# Patient Record
Sex: FEMALE
[De-identification: names, ages, dates, MRNs, and addresses within clinical notes are randomized per-mention and may not be internally consistent; named-entity substitution may affect disease eponyms.]

---

## 2018-03-13 PROBLEM — Z00.00 ENCOUNTER FOR PREVENTIVE HEALTH EXAMINATION: Status: ACTIVE | Noted: 2018-03-13

## 2018-05-03 ENCOUNTER — LABORATORY RESULT (OUTPATIENT)
Age: 43
End: 2018-05-03

## 2018-05-03 ENCOUNTER — APPOINTMENT (OUTPATIENT)
Dept: RHEUMATOLOGY | Facility: CLINIC | Age: 43
End: 2018-05-03
Payer: MEDICAID

## 2018-05-03 ENCOUNTER — TRANSCRIPTION ENCOUNTER (OUTPATIENT)
Age: 43
End: 2018-05-03

## 2018-05-03 VITALS
BODY MASS INDEX: 30.21 KG/M2 | HEART RATE: 90 BPM | WEIGHT: 160 LBS | HEIGHT: 61 IN | SYSTOLIC BLOOD PRESSURE: 123 MMHG | RESPIRATION RATE: 16 BRPM | OXYGEN SATURATION: 100 % | DIASTOLIC BLOOD PRESSURE: 88 MMHG | TEMPERATURE: 98.5 F

## 2018-05-03 DIAGNOSIS — K80.20 CALCULUS OF GALLBLADDER W/OUT CHOLECYSTITIS W/OUT OBSTRUCTION: ICD-10-CM

## 2018-05-03 DIAGNOSIS — Z78.9 OTHER SPECIFIED HEALTH STATUS: ICD-10-CM

## 2018-05-03 DIAGNOSIS — Z82.61 FAMILY HISTORY OF ARTHRITIS: ICD-10-CM

## 2018-05-03 DIAGNOSIS — Z82.0 FAMILY HISTORY OF EPILEPSY AND OTHER DISEASES OF THE NERVOUS SYSTEM: ICD-10-CM

## 2018-05-03 DIAGNOSIS — Z80.41 FAMILY HISTORY OF MALIGNANT NEOPLASM OF OVARY: ICD-10-CM

## 2018-05-03 DIAGNOSIS — Z83.3 FAMILY HISTORY OF DIABETES MELLITUS: ICD-10-CM

## 2018-05-03 DIAGNOSIS — Z82.62 FAMILY HISTORY OF OSTEOPOROSIS: ICD-10-CM

## 2018-05-03 DIAGNOSIS — Z84.89 FAMILY HISTORY OF OTHER SPECIFIED CONDITIONS: ICD-10-CM

## 2018-05-03 PROCEDURE — 20605 DRAIN/INJ JOINT/BURSA W/O US: CPT | Mod: LT

## 2018-05-03 PROCEDURE — 99205 OFFICE O/P NEW HI 60 MIN: CPT | Mod: 25

## 2018-05-03 RX ORDER — LIDOCAINE HYDROCHLORIDE 10 MG/ML
1 INJECTION, SOLUTION INFILTRATION; PERINEURAL
Qty: 0 | Refills: 0 | Status: COMPLETED | OUTPATIENT
Start: 2018-05-03

## 2018-05-03 RX ORDER — METHYLPRED ACET/NACL,ISO-OS/PF 40 MG/ML
40 VIAL (ML) INJECTION
Qty: 1 | Refills: 0 | Status: COMPLETED | OUTPATIENT
Start: 2018-05-03

## 2018-05-03 RX ADMIN — METHYLPREDNISOLONE ACETATE 0 MG/ML: 40 INJECTION, SUSPENSION INTRA-ARTICULAR; INTRALESIONAL; INTRAMUSCULAR; SOFT TISSUE at 00:00

## 2018-05-03 RX ADMIN — Medication 0 %: at 00:00

## 2018-05-04 PROBLEM — Z80.41 FAMILY HISTORY OF MALIGNANT NEOPLASM OF OVARY: Status: ACTIVE | Noted: 2018-05-03

## 2018-05-04 PROBLEM — Z83.3 FAMILY HISTORY OF DIABETES MELLITUS: Status: ACTIVE | Noted: 2018-05-03

## 2018-05-04 PROBLEM — Z82.62 FAMILY HISTORY OF OSTEOPOROSIS: Status: ACTIVE | Noted: 2018-05-03

## 2018-05-04 PROBLEM — Z82.61 FAMILY HISTORY OF ARTHRITIS: Status: ACTIVE | Noted: 2018-05-03

## 2018-05-04 PROBLEM — Z84.89 FAMILY HISTORY OF HEART MURMUR: Status: ACTIVE | Noted: 2018-05-03

## 2018-05-04 PROBLEM — Z82.0 FAMILY HISTORY OF ALZHEIMER'S DISEASE: Status: ACTIVE | Noted: 2018-05-03

## 2018-05-04 PROBLEM — Z78.9 NON-SMOKER: Status: ACTIVE | Noted: 2018-05-04

## 2018-05-04 PROBLEM — K80.20 GALLSTONES: Status: RESOLVED | Noted: 2018-05-03 | Resolved: 2018-05-04

## 2018-05-04 LAB
CRP SERPL-MCNC: 0.3 MG/DL
ERYTHROCYTE [SEDIMENTATION RATE] IN BLOOD BY WESTERGREN METHOD: 7 MM/HR
MPO AB + PR3 PNL SER: NORMAL

## 2018-05-04 RX ORDER — CHROMIUM 200 MCG
TABLET ORAL
Refills: 0 | Status: ACTIVE | COMMUNITY

## 2018-05-04 RX ORDER — METRONIDAZOLE 250 MG/1
250 TABLET, FILM COATED ORAL
Refills: 0 | Status: ACTIVE | COMMUNITY

## 2018-05-04 RX ORDER — FOLIC ACID 20 MG
CAPSULE ORAL
Refills: 0 | Status: ACTIVE | COMMUNITY

## 2018-05-04 RX ORDER — OMEPRAZOLE, SODIUM BICARBONATE 40; 1100 MG/1; MG/1
CAPSULE ORAL
Refills: 0 | Status: ACTIVE | COMMUNITY

## 2018-05-04 RX ORDER — ALBUTEROL SULFATE 4 MG/1
TABLET ORAL
Refills: 0 | Status: ACTIVE | COMMUNITY

## 2018-05-31 ENCOUNTER — RESULT REVIEW (OUTPATIENT)
Age: 43
End: 2018-05-31

## 2018-05-31 ENCOUNTER — APPOINTMENT (OUTPATIENT)
Dept: RHEUMATOLOGY | Facility: CLINIC | Age: 43
End: 2018-05-31
Payer: MEDICAID

## 2018-05-31 VITALS
SYSTOLIC BLOOD PRESSURE: 120 MMHG | RESPIRATION RATE: 16 BRPM | BODY MASS INDEX: 30.02 KG/M2 | DIASTOLIC BLOOD PRESSURE: 78 MMHG | OXYGEN SATURATION: 99 % | WEIGHT: 159 LBS | HEART RATE: 73 BPM | TEMPERATURE: 97.9 F | HEIGHT: 61 IN

## 2018-05-31 DIAGNOSIS — M25.561 PAIN IN RIGHT KNEE: ICD-10-CM

## 2018-05-31 PROCEDURE — 99214 OFFICE O/P EST MOD 30 MIN: CPT | Mod: 25

## 2018-05-31 PROCEDURE — 20605 DRAIN/INJ JOINT/BURSA W/O US: CPT | Mod: RT

## 2018-05-31 RX ORDER — METHYLPRED ACET/NACL,ISO-OS/PF 40 MG/ML
40 VIAL (ML) INJECTION
Qty: 1 | Refills: 0 | Status: COMPLETED | OUTPATIENT
Start: 2018-05-31

## 2018-05-31 RX ORDER — DICLOFENAC SODIUM 16.05 MG/ML
1.5 SOLUTION TOPICAL
Qty: 1 | Refills: 0 | Status: ACTIVE | COMMUNITY
Start: 2018-05-31 | End: 1900-01-01

## 2018-05-31 RX ORDER — LIDOCAINE HYDROCHLORIDE 10 MG/ML
1 INJECTION, SOLUTION INFILTRATION; PERINEURAL
Qty: 0 | Refills: 0 | Status: COMPLETED | OUTPATIENT
Start: 2018-05-31

## 2018-05-31 RX ADMIN — Medication 0 MG/ML: at 00:00

## 2018-05-31 RX ADMIN — LIDOCAINE HYDROCHLORIDE 0 %: 10 INJECTION, SOLUTION EPIDURAL; INFILTRATION; INTRACAUDAL; PERINEURAL at 00:00

## 2018-09-27 ENCOUNTER — APPOINTMENT (OUTPATIENT)
Dept: RHEUMATOLOGY | Facility: CLINIC | Age: 43
End: 2018-09-27

## 2019-01-03 ENCOUNTER — APPOINTMENT (OUTPATIENT)
Dept: RHEUMATOLOGY | Facility: CLINIC | Age: 44
End: 2019-01-03
Payer: MEDICAID

## 2019-01-03 VITALS
DIASTOLIC BLOOD PRESSURE: 88 MMHG | WEIGHT: 174 LBS | TEMPERATURE: 98.1 F | SYSTOLIC BLOOD PRESSURE: 122 MMHG | BODY MASS INDEX: 32.85 KG/M2 | RESPIRATION RATE: 16 BRPM | OXYGEN SATURATION: 98 % | HEIGHT: 61 IN | HEART RATE: 84 BPM

## 2019-01-03 PROCEDURE — 99214 OFFICE O/P EST MOD 30 MIN: CPT

## 2019-03-14 ENCOUNTER — APPOINTMENT (OUTPATIENT)
Dept: RHEUMATOLOGY | Facility: CLINIC | Age: 44
End: 2019-03-14
Payer: MEDICAID

## 2019-03-14 VITALS
HEART RATE: 77 BPM | HEIGHT: 61 IN | WEIGHT: 171 LBS | OXYGEN SATURATION: 97 % | RESPIRATION RATE: 16 BRPM | BODY MASS INDEX: 32.28 KG/M2 | SYSTOLIC BLOOD PRESSURE: 126 MMHG | TEMPERATURE: 98.4 F | DIASTOLIC BLOOD PRESSURE: 86 MMHG

## 2019-03-14 PROCEDURE — 99214 OFFICE O/P EST MOD 30 MIN: CPT | Mod: 25

## 2019-03-14 PROCEDURE — 20550 NJX 1 TENDON SHEATH/LIGAMENT: CPT

## 2019-03-14 RX ORDER — MELOXICAM 15 MG/1
15 TABLET ORAL
Qty: 30 | Refills: 1 | Status: ACTIVE | COMMUNITY
Start: 2018-05-03 | End: 1900-01-01

## 2019-03-14 RX ORDER — DICLOFENAC SODIUM 10 MG/G
1 GEL TOPICAL
Qty: 1 | Refills: 2 | Status: ACTIVE | COMMUNITY
Start: 2019-03-14 | End: 1900-01-01

## 2019-03-14 RX ADMIN — Medication 0 MG/ML: at 00:00

## 2019-03-14 RX ADMIN — LIDOCAINE HYDROCHLORIDE 0 %: 10 INJECTION, SOLUTION INFILTRATION; PERINEURAL at 00:00

## 2019-03-18 NOTE — REVIEW OF SYSTEMS
[Recent Weight Gain (___ Lbs)] : recent [unfilled] ~Ulb weight gain [Abdominal Pain] : abdominal pain [Diarrhea] : diarrhea [Joint Pain] : joint pain [Joint Swelling] : joint swelling [Joint Stiffness] : joint stiffness [Difficulty Walking] : difficulty walking [Feelings Of Weakness] : feelings of weakness [Fever] : no fever [Chills] : no chills [Eye Pain] : no eye pain [Red Eyes] : eyes not red [Dry Eyes] : no dryness of the eyes [Sore Throat] : no sore throat [Hoarseness] : no hoarseness [Chest Pain] : no chest pain [Palpitations] : no palpitations [Lower Ext Edema] : no lower extremity edema [Shortness Of Breath] : no shortness of breath [Cough] : no cough [SOB on Exertion] : no shortness of breath during exertion [Heartburn] : no heartburn [Skin Lesions] : no skin lesions [Anxiety] : no anxiety [Depression] : no depression [Muscle Weakness] : no muscle weakness [Easy Bleeding] : no tendency for easy bleeding [Easy Bruising] : no tendency for easy bruising

## 2019-03-18 NOTE — PHYSICAL EXAM
[General Appearance - Alert] : alert [General Appearance - In No Acute Distress] : in no acute distress [Sclera] : the sclera and conjunctiva were normal [Examination Of The Oral Cavity] : the lips and gums were normal [Oropharynx] : the oropharynx was normal [Neck Appearance] : the appearance of the neck was normal [Auscultation Breath Sounds / Voice Sounds] : lungs were clear to auscultation bilaterally [Heart Rate And Rhythm] : heart rate was normal and rhythm regular [Edema] : there was no peripheral edema [No Spinal Tenderness] : no spinal tenderness [Abnormal Walk] : normal gait [Nail Clubbing] : no clubbing  or cyanosis of the fingernails [Musculoskeletal - Swelling] : no joint swelling seen [Motor Tone] : muscle strength and tone were normal [] : no rash [Skin Lesions] : no skin lesions [Motor Exam] : the motor exam was normal [Oriented To Time, Place, And Person] : oriented to person, place, and time [FreeTextEntry1] : Tinel + b/l

## 2019-03-18 NOTE — DATA REVIEWED
[FreeTextEntry1] : Blood tests reviewed from [facility] 2/14 /2018\par Rheumatoid factor negative\par Anti-CCP negative\par GLENDA negative\par Anti-SSA SSB negative\par Anti-Hull RNP negative\par Negative for anti-Smith\par TSH within normal limits\par CRP of 1.4 (upper limit normal limits of less than 0.5) ESR of 36 \par mildly elevated vitamin D of 20.3\par Hepatitis panel negative

## 2019-03-18 NOTE — CONSULT LETTER
[Dear  ___] : Dear  [unfilled], [Courtesy Letter:] : I had the pleasure of seeing your patient, [unfilled], in my office today. [Please see my note below.] : Please see my note below. [Consult Closing:] : Thank you very much for allowing me to participate in the care of this patient.  If you have any questions, please do not hesitate to contact me. [Sincerely,] : Sincerely, [Fany Vann MD] : Fany Vann MD [ of Medicine] :  of Medicine [[facility]] : [facility] [FreeTextEntry2] : Christy Floyd MD\par [address]\par [facility]  [id number] [FreeTextEntry3] : Fany Vann M.D.\par  of Medicine \par [facility] at [facility]/[place]\par \par

## 2019-03-18 NOTE — HISTORY OF PRESENT ILLNESS
[FreeTextEntry1] : Patient reports resurfacing of pain in the hands bilaterally, especially the left hand . She reports the pain left thumb interferes with ADLs;  she cannot extend or flex the  thumb as she is with shooting pain up the left extremity. She finds she is dropping things from her hands as well ; she explains cortisone injections given in April and May of 2018 lended to pain free symptoms  from CTS for many months until she found repetitive tasks at work leading to resurfacing of pain and paresthesias. She also describes  pain upon tight grasp. She reports neck spasm lending to discomfort.  She has not been able to see obtain wrist splint secondary to cost. She otherwise denies joint swelling. \par She denies motor disturbances or constitutional symptoms.\par \par

## 2019-03-18 NOTE — PROCEDURE
[Other Date:___] : Date: [unfilled] [Patient] : the patient [Risks] : risks [Therapeutic] : therapeutic [#1 Site: ______] : #1 site identified in the [unfilled] [Betadine] : betadine solution [25 gauge 1.5 inch] : A 25 gauge 1.5 inch needle was used [___ml 1% Lidocaine] : [unfilled] ml of 1% lidocaine [Depomedrol ___ mg] : Depomedrol [unfilled] mg [No Complications] : there were no complications [Patient Instructed to Call] : patient was instructed to call if redness at site, a decrease in range of motion or an increase in pain is noted after procedure.

## 2019-03-18 NOTE — ASSESSMENT
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  Patient will benefit from PT/OT to help with joint mobility and muscle strengthening. Cortisone injection for De Quervain's for relief.  Cervical neck exercises demonstrated.  \par Quadriceps strengthening exercises reiterated in the office. Weight loss has been encouraged to reduce load over the medial joint line. Viscosupplementation has been encouraged to provide additional lubrication and joint support. In the interim, Diclofenac gel has been prescribed as well as judicious use of anti-inflammatory therapy if with worsening pain. Trial of NSAIDs given as well. \par She is in agreement with the above plan and will return in one months' time.\par \par  \par

## 2019-05-23 ENCOUNTER — LABORATORY RESULT (OUTPATIENT)
Age: 44
End: 2019-05-23

## 2019-05-23 ENCOUNTER — APPOINTMENT (OUTPATIENT)
Dept: RHEUMATOLOGY | Facility: CLINIC | Age: 44
End: 2019-05-23
Payer: MEDICAID

## 2019-05-23 VITALS
OXYGEN SATURATION: 96 % | DIASTOLIC BLOOD PRESSURE: 86 MMHG | RESPIRATION RATE: 16 BRPM | TEMPERATURE: 97.7 F | SYSTOLIC BLOOD PRESSURE: 119 MMHG | HEART RATE: 72 BPM | WEIGHT: 160 LBS | BODY MASS INDEX: 30.21 KG/M2 | HEIGHT: 61 IN

## 2019-05-23 DIAGNOSIS — M25.50 PAIN IN UNSPECIFIED JOINT: ICD-10-CM

## 2019-05-23 DIAGNOSIS — S16.1XXA STRAIN OF MUSCLE, FASCIA AND TENDON AT NECK LEVEL, INITIAL ENCOUNTER: ICD-10-CM

## 2019-05-23 PROCEDURE — 99214 OFFICE O/P EST MOD 30 MIN: CPT | Mod: 25

## 2019-05-23 PROCEDURE — 20550 NJX 1 TENDON SHEATH/LIGAMENT: CPT | Mod: LT

## 2019-05-23 RX ORDER — METHYLPRED ACET/NACL,ISO-OS/PF 40 MG/ML
40 VIAL (ML) INJECTION
Qty: 1 | Refills: 0 | Status: COMPLETED | OUTPATIENT
Start: 2019-05-23

## 2019-05-23 RX ORDER — LIDOCAINE HYDROCHLORIDE 10 MG/ML
1 INJECTION, SOLUTION INFILTRATION; PERINEURAL
Qty: 0 | Refills: 0 | Status: COMPLETED | OUTPATIENT
Start: 2019-05-23

## 2019-05-23 RX ADMIN — Medication 0 MG/ML: at 00:00

## 2019-05-23 RX ADMIN — LIDOCAINE HYDROCHLORIDE 0 %: 10 INJECTION, SOLUTION INFILTRATION; PERINEURAL at 00:00

## 2019-05-23 RX ADMIN — METHYLPREDNISOLONE ACETATE 0 MG/ML: 40 INJECTION, SUSPENSION INTRA-ARTICULAR; INTRALESIONAL; INTRAMUSCULAR; SOFT TISSUE at 00:00

## 2019-05-24 ENCOUNTER — OTHER (OUTPATIENT)
Age: 44
End: 2019-05-24

## 2019-05-27 LAB
ALBUMIN SERPL ELPH-MCNC: 4.6 G/DL
ALP BLD-CCNC: 112 U/L
ALT SERPL-CCNC: 56 U/L
ANION GAP SERPL CALC-SCNC: 14 MMOL/L
APPEARANCE: ABNORMAL
ASO AB SER LA-ACNC: 132 IU/ML
AST SERPL-CCNC: 32 U/L
BASOPHILS # BLD AUTO: 0.06 K/UL
BASOPHILS NFR BLD AUTO: 0.5 %
BILIRUB SERPL-MCNC: 1.4 MG/DL
BILIRUBIN URINE: NEGATIVE
BLOOD URINE: NORMAL
BUN SERPL-MCNC: 10 MG/DL
CALCIUM SERPL-MCNC: 10.1 MG/DL
CHLORIDE SERPL-SCNC: 103 MMOL/L
CO2 SERPL-SCNC: 24 MMOL/L
COLOR: ABNORMAL
CREAT SERPL-MCNC: 0.7 MG/DL
CRP SERPL-MCNC: 0.67 MG/DL
EOSINOPHIL # BLD AUTO: 0.12 K/UL
EOSINOPHIL NFR BLD AUTO: 1 %
ERYTHROCYTE [SEDIMENTATION RATE] IN BLOOD BY WESTERGREN METHOD: 35 MM/HR
GLUCOSE QUALITATIVE U: NEGATIVE
GLUCOSE SERPL-MCNC: 92 MG/DL
HCT VFR BLD CALC: 43.3 %
HGB BLD-MCNC: 13.6 G/DL
IMM GRANULOCYTES NFR BLD AUTO: 0.5 %
KETONES URINE: NEGATIVE
LEUKOCYTE ESTERASE URINE: NEGATIVE
LYMPHOCYTES # BLD AUTO: 3.04 K/UL
LYMPHOCYTES NFR BLD AUTO: 24.3 %
MAN DIFF?: NORMAL
MCHC RBC-ENTMCNC: 27 PG
MCHC RBC-ENTMCNC: 31.4 GM/DL
MCV RBC AUTO: 85.9 FL
MONOCYTES # BLD AUTO: 1.05 K/UL
MONOCYTES NFR BLD AUTO: 8.4 %
NEUTROPHILS # BLD AUTO: 8.17 K/UL
NEUTROPHILS NFR BLD AUTO: 65.3 %
NITRITE URINE: NEGATIVE
PH URINE: 5.5
PLATELET # BLD AUTO: 329 K/UL
POTASSIUM SERPL-SCNC: 3.6 MMOL/L
PROT SERPL-MCNC: 7.4 G/DL
PROTEIN URINE: ABNORMAL
RBC # BLD: 5.04 M/UL
RBC # FLD: 13.1 %
SODIUM SERPL-SCNC: 141 MMOL/L
SPECIFIC GRAVITY URINE: 1.03
URATE SERPL-MCNC: 6 MG/DL
UROBILINOGEN URINE: NORMAL
WBC # FLD AUTO: 12.5 K/UL

## 2019-05-27 NOTE — REVIEW OF SYSTEMS
[Recent Weight Gain (___ Lbs)] : recent [unfilled] ~Ulb weight gain [Abdominal Pain] : abdominal pain [Diarrhea] : diarrhea [Joint Pain] : joint pain [Joint Swelling] : joint swelling [Joint Stiffness] : joint stiffness [Difficulty Walking] : difficulty walking [Feelings Of Weakness] : feelings of weakness [Fever] : no fever [Chills] : no chills [Eye Pain] : no eye pain [Red Eyes] : eyes not red [Dry Eyes] : no dryness of the eyes [Sore Throat] : no sore throat [Hoarseness] : no hoarseness [Palpitations] : no palpitations [Chest Pain] : no chest pain [Lower Ext Edema] : no lower extremity edema [Shortness Of Breath] : no shortness of breath [Cough] : no cough [Heartburn] : no heartburn [SOB on Exertion] : no shortness of breath during exertion [Depression] : no depression [Anxiety] : no anxiety [Skin Lesions] : no skin lesions [Easy Bruising] : no tendency for easy bruising [Easy Bleeding] : no tendency for easy bleeding [Muscle Weakness] : no muscle weakness

## 2019-05-27 NOTE — ASSESSMENT
[FreeTextEntry1] : Patient with arthralgias stemming from early DJD with b/l CTS with improvement in De Quervain's tendonitis, now with lateral epicondylitis:\par \par Patient to have inflammatory markers and serologies drawn to assess for an underlying process lending to recent fatigue.  Patient given Neurology referral for discussion on carpal tunnel release.  Patient will benefit from PT/OT to help with joint mobility and muscle strengthening. Cortisone injection for lateral epicondylitis.  Patient encouraged to wear wrist splints for joint stabilization.  Cervical neck exercises demonstrated.  \par Quadriceps strengthening exercises reiterated in the office. Weight loss has been encouraged to reduce load over the medial joint line. Viscosupplementation has been encouraged to provide additional lubrication and joint support. In the interim, Diclofenac gel has been prescribed as well as judicious use of anti-inflammatory therapy if with worsening pain. \par She is in agreement with the above plan and will return in one months' time.\par \par  \par

## 2019-05-27 NOTE — CONSULT LETTER
[Dear  ___] : Dear  [unfilled], [Courtesy Letter:] : I had the pleasure of seeing your patient, [unfilled], in my office today. [Please see my note below.] : Please see my note below. [Consult Closing:] : Thank you very much for allowing me to participate in the care of this patient.  If you have any questions, please do not hesitate to contact me. [Sincerely,] : Sincerely, [Fany Vann MD] : Fany Vann MD [ of Medicine] :  of Medicine [[facility]] : [facility] [FreeTextEntry3] : Fany Vann M.D.\par  of Medicine \par [facility]/[place]\par \par  [FreeTextEntry2] : Christy Floyd MD\par [address]\par [facility]  [id number]

## 2019-05-27 NOTE — HISTORY OF PRESENT ILLNESS
[FreeTextEntry1] : Patient reports worsening pain over the left elbow. She reports progressive pain and paresthesias upon lifting items more than five pounds. She finds it difficult to comb her hair at times as well as reporting dropping objects from hand secondary to weakness. She continues to wear a wrist splint intermittently; she denies additional trauma to the elbow joint. She also expresses fatigue over the last few weeks reports prior sick contacts and myalgias accompanied by a sore throat. She denies fevers,  she otherwise denies systemic symptoms, motor disturbances or rash.

## 2019-05-27 NOTE — PROCEDURE
[Patient] : the patient [Risks] : risks [Therapeutic] : therapeutic [Betadine] : betadine solution [25 gauge 1.5 inch] : A 25 gauge 1.5 inch needle was used [___ml 1% Lidocaine] : [unfilled] ml of 1% lidocaine [No Complications] : there were no complications [Patient Instructed to Call] : patient was instructed to call if redness at site, a decrease in range of motion or an increase in pain is noted after procedure. [#1 Site: ______] : #1 site identified in the [unfilled] [Other Date:___] : Date: [unfilled] [Depomedrol ___ mg] : Depomedrol [unfilled] mg

## 2019-08-22 ENCOUNTER — APPOINTMENT (OUTPATIENT)
Dept: RHEUMATOLOGY | Facility: CLINIC | Age: 44
End: 2019-08-22

## 2019-09-12 ENCOUNTER — APPOINTMENT (OUTPATIENT)
Dept: RHEUMATOLOGY | Facility: CLINIC | Age: 44
End: 2019-09-12
Payer: MEDICAID

## 2019-09-12 VITALS
HEART RATE: 83 BPM | DIASTOLIC BLOOD PRESSURE: 89 MMHG | RESPIRATION RATE: 16 BRPM | OXYGEN SATURATION: 97 % | WEIGHT: 164 LBS | BODY MASS INDEX: 30.96 KG/M2 | TEMPERATURE: 98.5 F | SYSTOLIC BLOOD PRESSURE: 135 MMHG | HEIGHT: 61 IN

## 2019-09-12 DIAGNOSIS — G60.0 HEREDITARY MOTOR AND SENSORY NEUROPATHY: ICD-10-CM

## 2019-09-12 PROCEDURE — 99214 OFFICE O/P EST MOD 30 MIN: CPT

## 2019-09-13 NOTE — HISTORY OF PRESENT ILLNESS
[FreeTextEntry1] : Patient reports improvement in lateral epicondylitis after cortisone injection, however she c/w with L wrist pain with subsequent MR revealing cyst over distal radioulnar joint.   She reports progressive pain and paresthesias upon lifting items more than five pounds. She finds it difficult to comb her hair at times as well as reporting dropping objects from hand secondary to weakness. She continues to wear a wrist splint intermittently; she also expresses fatigue.  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 She was naziaen a presumptive dx of MS as well.   Patient underwent full Neurology evaluation including MR, EEG, results below.   She explains hx of seizures and visual disturbances; she notes LE weakness.\par She otherwise denies systemic symptoms.

## 2019-09-13 NOTE — ASSESSMENT
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   Patient will benefit from PT/OT to help with joint mobility and muscle strengthening; neuromuscular rehabilitation referral given. Patient encouraged to wear wrist splints for joint stabilization.  Cervical neck exercises demonstrated.  \par Quadriceps strengthening exercises reiterated in the office. Weight loss has been encouraged to reduce load over the medial joint line. Viscosupplementation has been encouraged to provide additional lubrication and joint support. In the interim, Diclofenac gel has been prescribed as well as judicious use of anti-inflammatory therapy if with worsening pain. \par She is in agreement with the above plan and will return in one months' time.\par \par  \par

## 2019-09-13 NOTE — CONSULT LETTER
[Dear  ___] : Dear  [unfilled], [Courtesy Letter:] : I had the pleasure of seeing your patient, [unfilled], in my office today. [Consult Closing:] : Thank you very much for allowing me to participate in the care of this patient.  If you have any questions, please do not hesitate to contact me. [Please see my note below.] : Please see my note below. [Sincerely,] : Sincerely, [FreeTextEntry2] : Christy Floyd MD\par [address]\par [facility] [address] \par  [FreeTextEntry3] : Fany Vann M.D.\par  of Medicine \par [facility] at [facility]/[place]\par \par

## 2019-09-13 NOTE — PHYSICAL EXAM
[General Appearance - In No Acute Distress] : in no acute distress [General Appearance - Alert] : alert [Sclera] : the sclera and conjunctiva were normal [Examination Of The Oral Cavity] : the lips and gums were normal [Oropharynx] : the oropharynx was normal [Neck Appearance] : the appearance of the neck was normal [Auscultation Breath Sounds / Voice Sounds] : lungs were clear to auscultation bilaterally [Heart Rate And Rhythm] : heart rate was normal and rhythm regular [No Spinal Tenderness] : no spinal tenderness [Edema] : there was no peripheral edema [Abnormal Walk] : normal gait [Nail Clubbing] : no clubbing  or cyanosis of the fingernails [Motor Tone] : muscle strength and tone were normal [Musculoskeletal - Swelling] : no joint swelling seen [Skin Lesions] : no skin lesions [] : no rash [Oriented To Time, Place, And Person] : oriented to person, place, and time [FreeTextEntry1] : Tinel + b/l 4/5 strength in LE ; UE equal and reactive

## 2019-09-13 NOTE — DATA REVIEWED
[FreeTextEntry1] : MR cervical spine with and without contrast performed August 2019:\par No detectable abnormal spinal cord signal or abnormal enhancement\par Multilevel degenerative changes\par MRI thoracic spine with and without contrast:\par Questionable hyperintense T2 weighted signal projecting over the left aspect of the conus medullaris\par Mild degenerative changes\par X-ray cervical spine April 2019:\par Straightening of the normal cervical lordosis\par Narrowing of the C4-5 disc space there is small and no osteophytes and endplate deformity 4 and 5\par L. spine there is mild bilateral L3-4 L5-S1 facet arthrosis\par Straightening of the normal lumbar lordosis\par MRI brain in July 2019:\par Unremarkable contrast-enhanced MRI of the brain\par EEG performed August 2019:\par Normal EEG\par \par Genetic testing performed July 2019:\par Patient heterozygous for gene DNM2

## 2019-10-03 ENCOUNTER — INPATIENT (INPATIENT)
Facility: HOSPITAL | Age: 44
LOS: 3 days | Discharge: HOME | DRG: 101 | End: 2019-10-07
Attending: INTERNAL MEDICINE | Admitting: INTERNAL MEDICINE
Payer: MEDICAID

## 2019-10-03 VITALS
WEIGHT: 160.06 LBS | HEIGHT: 62 IN | DIASTOLIC BLOOD PRESSURE: 78 MMHG | HEART RATE: 87 BPM | TEMPERATURE: 99 F | RESPIRATION RATE: 16 BRPM | SYSTOLIC BLOOD PRESSURE: 126 MMHG | OXYGEN SATURATION: 99 %

## 2019-10-03 DIAGNOSIS — R56.9 UNSPECIFIED CONVULSIONS: ICD-10-CM

## 2019-10-03 LAB
ALBUMIN SERPL ELPH-MCNC: 3.7 G/DL — SIGNIFICANT CHANGE UP (ref 3.5–5)
ALP SERPL-CCNC: 103 U/L — SIGNIFICANT CHANGE UP (ref 40–120)
ALT FLD-CCNC: 50 U/L DA — SIGNIFICANT CHANGE UP (ref 10–60)
ANION GAP SERPL CALC-SCNC: 8 MMOL/L — SIGNIFICANT CHANGE UP (ref 5–17)
AST SERPL-CCNC: 35 U/L — SIGNIFICANT CHANGE UP (ref 10–40)
BASOPHILS # BLD AUTO: 0.04 K/UL — SIGNIFICANT CHANGE UP (ref 0–0.2)
BASOPHILS NFR BLD AUTO: 0.4 % — SIGNIFICANT CHANGE UP (ref 0–2)
BILIRUB SERPL-MCNC: 1.7 MG/DL — HIGH (ref 0.2–1.2)
BUN SERPL-MCNC: 7 MG/DL — SIGNIFICANT CHANGE UP (ref 7–18)
CALCIUM SERPL-MCNC: 9.3 MG/DL — SIGNIFICANT CHANGE UP (ref 8.4–10.5)
CHLORIDE SERPL-SCNC: 107 MMOL/L — SIGNIFICANT CHANGE UP (ref 96–108)
CO2 SERPL-SCNC: 26 MMOL/L — SIGNIFICANT CHANGE UP (ref 22–31)
CREAT SERPL-MCNC: 0.72 MG/DL — SIGNIFICANT CHANGE UP (ref 0.5–1.3)
EOSINOPHIL # BLD AUTO: 0.13 K/UL — SIGNIFICANT CHANGE UP (ref 0–0.5)
EOSINOPHIL NFR BLD AUTO: 1.3 % — SIGNIFICANT CHANGE UP (ref 0–6)
GLUCOSE SERPL-MCNC: 81 MG/DL — SIGNIFICANT CHANGE UP (ref 70–99)
HCG SERPL-ACNC: <1 MIU/ML — SIGNIFICANT CHANGE UP
HCT VFR BLD CALC: 41.2 % — SIGNIFICANT CHANGE UP (ref 34.5–45)
HGB BLD-MCNC: 13.2 G/DL — SIGNIFICANT CHANGE UP (ref 11.5–15.5)
IMM GRANULOCYTES NFR BLD AUTO: 0.4 % — SIGNIFICANT CHANGE UP (ref 0–1.5)
LACTATE SERPL-SCNC: 1 MMOL/L — SIGNIFICANT CHANGE UP (ref 0.7–2)
LYMPHOCYTES # BLD AUTO: 2.7 K/UL — SIGNIFICANT CHANGE UP (ref 1–3.3)
LYMPHOCYTES # BLD AUTO: 27.7 % — SIGNIFICANT CHANGE UP (ref 13–44)
MCHC RBC-ENTMCNC: 27.7 PG — SIGNIFICANT CHANGE UP (ref 27–34)
MCHC RBC-ENTMCNC: 32 GM/DL — SIGNIFICANT CHANGE UP (ref 32–36)
MCV RBC AUTO: 86.4 FL — SIGNIFICANT CHANGE UP (ref 80–100)
MONOCYTES # BLD AUTO: 0.94 K/UL — HIGH (ref 0–0.9)
MONOCYTES NFR BLD AUTO: 9.6 % — SIGNIFICANT CHANGE UP (ref 2–14)
NEUTROPHILS # BLD AUTO: 5.9 K/UL — SIGNIFICANT CHANGE UP (ref 1.8–7.4)
NEUTROPHILS NFR BLD AUTO: 60.6 % — SIGNIFICANT CHANGE UP (ref 43–77)
NRBC # BLD: 0 /100 WBCS — SIGNIFICANT CHANGE UP (ref 0–0)
PLATELET # BLD AUTO: 246 K/UL — SIGNIFICANT CHANGE UP (ref 150–400)
POTASSIUM SERPL-MCNC: 3.2 MMOL/L — LOW (ref 3.5–5.3)
POTASSIUM SERPL-SCNC: 3.2 MMOL/L — LOW (ref 3.5–5.3)
PROT SERPL-MCNC: 7.6 G/DL — SIGNIFICANT CHANGE UP (ref 6–8.3)
RBC # BLD: 4.77 M/UL — SIGNIFICANT CHANGE UP (ref 3.8–5.2)
RBC # FLD: 14 % — SIGNIFICANT CHANGE UP (ref 10.3–14.5)
SODIUM SERPL-SCNC: 141 MMOL/L — SIGNIFICANT CHANGE UP (ref 135–145)
WBC # BLD: 9.75 K/UL — SIGNIFICANT CHANGE UP (ref 3.8–10.5)
WBC # FLD AUTO: 9.75 K/UL — SIGNIFICANT CHANGE UP (ref 3.8–10.5)

## 2019-10-03 PROCEDURE — 99223 1ST HOSP IP/OBS HIGH 75: CPT

## 2019-10-03 PROCEDURE — 93010 ELECTROCARDIOGRAM REPORT: CPT

## 2019-10-03 RX ORDER — CYCLOBENZAPRINE HYDROCHLORIDE 10 MG/1
5 TABLET, FILM COATED ORAL THREE TIMES A DAY
Refills: 0 | Status: DISCONTINUED | OUTPATIENT
Start: 2019-10-03 | End: 2019-10-07

## 2019-10-03 RX ORDER — ACETAMINOPHEN 500 MG
650 TABLET ORAL EVERY 6 HOURS
Refills: 0 | Status: DISCONTINUED | OUTPATIENT
Start: 2019-10-03 | End: 2019-10-07

## 2019-10-03 RX ORDER — GABAPENTIN 400 MG/1
300 CAPSULE ORAL EVERY 8 HOURS
Refills: 0 | Status: DISCONTINUED | OUTPATIENT
Start: 2019-10-03 | End: 2019-10-07

## 2019-10-03 RX ADMIN — CYCLOBENZAPRINE HYDROCHLORIDE 5 MILLIGRAM(S): 10 TABLET, FILM COATED ORAL at 22:01

## 2019-10-03 RX ADMIN — GABAPENTIN 300 MILLIGRAM(S): 400 CAPSULE ORAL at 22:01

## 2019-10-03 NOTE — H&P ADULT - PROBLEM SELECTOR PLAN 5
Improve score 1-No chemoprophylaxis at present  Re-eval in 24 hours  Early mobilization c/w pantoprazole  No epigastric pain/tenderness at present

## 2019-10-03 NOTE — H&P ADULT - HISTORY OF PRESENT ILLNESS
Pt is a 44 yr lo Pt is a 44 yr old female with PMH of asthma, CMF disease, extensive seizure h/o not on any anti-seizure medications was brought in for witnessed episode of seizure while she was in her PT session. Pt states that she has been having seizures with initial episodes in 2008, underwent workup and was not started on any anti-seizure medications coming in for generalized tonic clonic seizure. As per patient she had 5-6 seizures daily for 1 year in 2008 that improved but she continues to have seizures. Pt follows neurologist and rheumatologist as out-patient. Today in PT session she states that she felt dizzy and some vision changes and then she doesn't remember. As per daughter she had a tonic clonic seizure, pt was biting lips, no bowel/bladder incontinence noted. Pt also complaint of dull headache. Pt denies any chest pain, syncope, neck pain, fevers at home, ill contact, new medication, N/V/D. Pt had a MRI and EEG done in August 2019 that was negative. Pt denies smoking, alcohol or drug usage. 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

## 2019-10-03 NOTE — H&P ADULT - PROBLEM SELECTOR PLAN 3
Pt states that she was recently diagnosed for CMT disease by genetic testing  Pt needs to follow up with Out-pt NM Disease specialist Pt states that she was recently diagnosed for CMT disease by genetic testing  Pt needs to follow up with Out-pt NM Disease specialist  Pt started on flexiril, gabapentin  Pt requesting second neurologist opinion

## 2019-10-03 NOTE — ED PROVIDER NOTE - OBJECTIVE STATEMENT
44 yr old female with hx of asthma, charcot foot on PT, seizure but never placed on meds since 2008 presents to ed c/o seizure episode possible 5 mins in duration.  pt was performing typical PT exercises and felt unwell ( dark sensation, right sided head numbness sensation, weak) witness by daughter. generalized tonic clonic- hands and legs cletch, tongue biting.  no incontinence, no fever, no chills, no abd pain, no cp.  pt states 2 day ago with similar.  pt in past told had seizure but never placed on meds

## 2019-10-03 NOTE — H&P ADULT - ATTENDING COMMENTS
Patient seen and examined ; case was discussed with the admitting resident    ROS: as in the HPI; all other ROS negative    SH and family history as above    Vital Signs Last 24 Hrs  T(C): 36.8 (03 Oct 2019 18:55), Max: 37.1 (03 Oct 2019 17:08)  T(F): 98.2 (03 Oct 2019 18:55), Max: 98.7 (03 Oct 2019 17:08)  HR: 75 (03 Oct 2019 18:55) (75 - 87)  BP: 118/78 (03 Oct 2019 18:55) (118/78 - 126/78)  BP(mean): --  RR: 16 (03 Oct 2019 18:55) (16 - 16)  SpO2: 100% (03 Oct 2019 18:55) (99% - 100%)    GEN: NAD  HEENT- normocephalic; mouth moist  NECK: paraspinal mm spasm   CVS- S1S2+  LUNGS- clear to auscultation; no wheezing, decreased breath sounds   ABD: Soft , nontender, nondistended, Bowel sounds are present  EXTREMITY: no calf tenderness, no cyanosis, no edema  NEURO: AAOx3; non focal neurologic exam; cranial nerves grossly intact, MST 5- in quad flexors on R, all other 5/5 and symmetric with UE/LE BL  PSYCH: normal affect and behavior, mildly anxious   BACK: no swelling or mass;   VASCULAR: ++ distal peripheral pulses  SKIN: warm and dry, hyperpigmentation over right hand       Labs Reviewed:                         13.2   9.75  )-----------( 246      ( 03 Oct 2019 17:31 )             41.2     10-03    141  |  107  |  7   ----------------------------<  81  3.2<L>   |  26  |  0.72    Ca    9.3      03 Oct 2019 17:31    TPro  7.6  /  Alb  3.7  /  TBili  1.7<H>  /  DBili  x   /  AST  35  /  ALT  50  /  AlkPhos  103  10-03            BNP:   MEDICATIONS  (STANDING):    MEDICATIONS  (PRN):  acetaminophen   Tablet .. 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 CMT is a recent dx based on genetic testing, unclear family hx, patient denies EMG/NCS or prev LP. Has had severeal recent MRIs of spine and brain which are reported to have some non specific changes but without acute pathology to explain her sx. She is not on AEDs, stating that there were concerns over side effects, specifically GI side effects. She was referred to a neuromuscular medicine specialist but has not followed up. She reports GTC movements with physical therapy, which she is attending for her gait disturbance. She has a clear prodrome of tightness over the top of the head and her vision going dark. Reported post ictal for 5 minutes on average. Denies bowel/bladder incontinence +tongue biting. Had previous episode day previously. Denies insomnia, infectious sx.     1. Seizure- obtain cpk, routine neurochecks, prn ativan for seizure activity, will hold AEDs for now. Will defer MRI given recently done, although we do not have the images in our system. Appreciate neurology consultation.   2. 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 Has referral to outpatient meuromuscular medicine but not able to see until December per scheduling constraints. Requesting second opinion from our neurologists.   (Manuel MCFARLAND, Emani D. Medication-induced exacerbation of neuropathy in Charcot Zoey Tooth disease. J Neurol Sci 2006; 242:47.)  3. Hypoglycemia- q6 glucose checks, no clinical e/o infection, no JEAN PIERRE, check C peptide   4. Muscle spasm- flexeril trial   5. Mild intermittent asthma   6. PUD- continue ppi     Plan of care discussed with patient ;  all questions and concerns were addressed.  Discussed with Patient's family

## 2019-10-03 NOTE — H&P ADULT - PROBLEM SELECTOR PLAN 1
Recurrent seizure epsiodes with unclear etiology, not on any anti-seizure medications  Recent MRI and EEG non-conclusive  Hypoglycemia noted, no other electrolytes abn on labs  f/u CK, LDH, Drug screen, EEG  Imaging as per neurology   Neurology consult

## 2019-10-03 NOTE — ED PROVIDER NOTE - CLINICAL SUMMARY MEDICAL DECISION MAKING FREE TEXT BOX
44 yr old female with hx of asthma, charcot foot on PT, seizure but never placed on meds since 2008 presents to ed c/o seizure episode possible 5 mins in duration.  pt was performing typical PT exercises and felt unwell ( dark sensation, right sided head numbness sensation, weak) witness by daughter. generalized tonic clonic- hands and legs cletch, tongue biting.  no incontinence, no fever, no chills, no abd pain, no cp.  pt states 2 day ago with similar.  pt in past told had seizure but never placed on meds    I suspect possible pseudoseizure since pt was not placed on meds.  but due to increase intensity will obtain labs, ct head, ua, admit for seizure work up

## 2019-10-03 NOTE — ED ADULT TRIAGE NOTE - CHIEF COMPLAINT QUOTE
witnessed seizure by physical therapy. pt sat in chair and started having seizure, c/o pain on r side of head

## 2019-10-03 NOTE — ED PROVIDER NOTE - PROGRESS NOTE DETAILS
del angel: work up neg. ct head neg.   Pt pending UA.  admit to med for possibly seizure vs complex migraine vvs pseudoseizure

## 2019-10-03 NOTE — ED PROVIDER NOTE - ENMT, MLM
Airway patent, Nasal mucosa clear. Mouth with normal mucosa. Throat has no vesicles, no oropharyngeal exudates and uvula is midline. no visible tongue laceration

## 2019-10-03 NOTE — ED ADULT NURSE NOTE - NSIMPLEMENTINTERV_GEN_ALL_ED
Implemented All Universal Safety Interventions:  [place]aud to call system. Call bell, personal items and telephone within reach. Instruct patient to call for assistance. Room bathroom lighting operational. Non-slip footwear when patient is off stretcher. Physically safe environment: no spills, clutter or unnecessary equipment. Stretcher in lowest position, wheels locked, appropriate side rails in place.

## 2019-10-03 NOTE — H&P ADULT - PROBLEM SELECTOR PLAN 2
Pt BS in ED 60  Denies any h/o hypoglycemia in past Pt BS in ED 60  Denies any h/o hypoglycemia in past  Finger stick q 6 for now  F/u c-peptide level

## 2019-10-03 NOTE — H&P ADULT - NSHPREVIEWOFSYSTEMS_GEN_ALL_CORE
REVIEW OF SYSTEMS:    CONSTITUTIONAL: No weakness, fevers or chills  EYES/ENT: No visual changes;  No vertigo or throat pain   NECK: No pain or stiffness  RESPIRATORY: No cough, wheezing, hemoptysis; No shortness of breath  CARDIOVASCULAR: No chest pain or palpitations  GASTROINTESTINAL: No abdominal or epigastric pain. No nausea, vomiting, or hematemesis; No diarrhea or constipation. No melena or hematochezia.  GENITOURINARY: No dysuria, frequency or hematuria  NEUROLOGICAL: No numbness or weakness  SKIN: No itching, rashes REVIEW OF SYSTEMS:    CONSTITUTIONAL: No weakness, fevers or chills  EYES/ENT: No visual changes;  No vertigo or throat pain   NECK: Neck pain/headache  RESPIRATORY: No cough, wheezing, hemoptysis; No shortness of breath  CARDIOVASCULAR: No chest pain or palpitations  GASTROINTESTINAL: No abdominal or epigastric pain. No nausea, vomiting, or hematemesis; No diarrhea or constipation. No melena or hematochezia.  GENITOURINARY: No dysuria, frequency or hematuria  NEUROLOGICAL: Numbness, LOC following seizure  SKIN: No itching, rashes

## 2019-10-03 NOTE — ED PROVIDER NOTE - NEUROLOGICAL, MLM
Alert and oriented, no focal deficits, no motor or sensory deficits. CN II-XII, non-ataxic gait, 5/5 strength on all 4

## 2019-10-04 DIAGNOSIS — J45.909 UNSPECIFIED ASTHMA, UNCOMPLICATED: ICD-10-CM

## 2019-10-04 DIAGNOSIS — Z29.9 ENCOUNTER FOR PROPHYLACTIC MEASURES, UNSPECIFIED: ICD-10-CM

## 2019-10-04 DIAGNOSIS — K27.9 PEPTIC ULCER, SITE UNSPECIFIED, UNSPECIFIED AS ACUTE OR CHRONIC, WITHOUT HEMORRHAGE OR PERFORATION: ICD-10-CM

## 2019-10-04 DIAGNOSIS — E16.2 HYPOGLYCEMIA, UNSPECIFIED: ICD-10-CM

## 2019-10-04 DIAGNOSIS — E80.6 OTHER DISORDERS OF BILIRUBIN METABOLISM: ICD-10-CM

## 2019-10-04 DIAGNOSIS — G60.0 HEREDITARY MOTOR AND SENSORY NEUROPATHY: ICD-10-CM

## 2019-10-04 LAB
ALBUMIN SERPL ELPH-MCNC: 3.4 G/DL — LOW (ref 3.5–5)
ALP SERPL-CCNC: 99 U/L — SIGNIFICANT CHANGE UP (ref 40–120)
ALT FLD-CCNC: 49 U/L DA — SIGNIFICANT CHANGE UP (ref 10–60)
ANION GAP SERPL CALC-SCNC: 7 MMOL/L — SIGNIFICANT CHANGE UP (ref 5–17)
APPEARANCE UR: CLEAR — SIGNIFICANT CHANGE UP
AST SERPL-CCNC: 33 U/L — SIGNIFICANT CHANGE UP (ref 10–40)
BILIRUB DIRECT SERPL-MCNC: 0.3 MG/DL — HIGH (ref 0–0.2)
BILIRUB INDIRECT FLD-MCNC: 1.6 MG/DL — HIGH (ref 0.2–1)
BILIRUB SERPL-MCNC: 1.9 MG/DL — HIGH (ref 0.2–1.2)
BILIRUB UR-MCNC: NEGATIVE — SIGNIFICANT CHANGE UP
BUN SERPL-MCNC: 9 MG/DL — SIGNIFICANT CHANGE UP (ref 7–18)
C PEPTIDE SERPL-MCNC: 2.5 NG/ML — SIGNIFICANT CHANGE UP (ref 1.1–4.4)
CALCIUM SERPL-MCNC: 9 MG/DL — SIGNIFICANT CHANGE UP (ref 8.4–10.5)
CHLORIDE SERPL-SCNC: 107 MMOL/L — SIGNIFICANT CHANGE UP (ref 96–108)
CHOLEST SERPL-MCNC: 181 MG/DL — SIGNIFICANT CHANGE UP (ref 10–199)
CK SERPL-CCNC: 39 U/L — SIGNIFICANT CHANGE UP (ref 21–215)
CO2 SERPL-SCNC: 28 MMOL/L — SIGNIFICANT CHANGE UP (ref 22–31)
COLOR SPEC: YELLOW — SIGNIFICANT CHANGE UP
CREAT SERPL-MCNC: 0.73 MG/DL — SIGNIFICANT CHANGE UP (ref 0.5–1.3)
DIFF PNL FLD: NEGATIVE — SIGNIFICANT CHANGE UP
ETHANOL SERPL-MCNC: <3 MG/DL — SIGNIFICANT CHANGE UP (ref 0–10)
FOLATE SERPL-MCNC: 9.1 NG/ML — SIGNIFICANT CHANGE UP
GLUCOSE BLDC GLUCOMTR-MCNC: 110 MG/DL — HIGH (ref 70–99)
GLUCOSE BLDC GLUCOMTR-MCNC: 123 MG/DL — HIGH (ref 70–99)
GLUCOSE BLDC GLUCOMTR-MCNC: 76 MG/DL — SIGNIFICANT CHANGE UP (ref 70–99)
GLUCOSE BLDC GLUCOMTR-MCNC: 78 MG/DL — SIGNIFICANT CHANGE UP (ref 70–99)
GLUCOSE SERPL-MCNC: 75 MG/DL — SIGNIFICANT CHANGE UP (ref 70–99)
GLUCOSE UR QL: NEGATIVE — SIGNIFICANT CHANGE UP
HBA1C BLD-MCNC: 5.2 % — SIGNIFICANT CHANGE UP (ref 4–5.6)
HCT VFR BLD CALC: 42.1 % — SIGNIFICANT CHANGE UP (ref 34.5–45)
HCT VFR BLD CALC: 43.8 % — SIGNIFICANT CHANGE UP (ref 34.5–45)
HDLC SERPL-MCNC: 53 MG/DL — SIGNIFICANT CHANGE UP
HGB BLD-MCNC: 13.3 G/DL — SIGNIFICANT CHANGE UP (ref 11.5–15.5)
HGB BLD-MCNC: 13.9 G/DL — SIGNIFICANT CHANGE UP (ref 11.5–15.5)
KETONES UR-MCNC: NEGATIVE — SIGNIFICANT CHANGE UP
LACTATE SERPL-SCNC: 1.7 MMOL/L — SIGNIFICANT CHANGE UP (ref 0.7–2)
LACTATE SERPL-SCNC: 5.3 MMOL/L — CRITICAL HIGH (ref 0.7–2)
LDH SERPL L TO P-CCNC: 146 U/L — SIGNIFICANT CHANGE UP (ref 120–225)
LEUKOCYTE ESTERASE UR-ACNC: NEGATIVE — SIGNIFICANT CHANGE UP
LIPID PNL WITH DIRECT LDL SERPL: 101 MG/DL — SIGNIFICANT CHANGE UP
MCHC RBC-ENTMCNC: 27.4 PG — SIGNIFICANT CHANGE UP (ref 27–34)
MCHC RBC-ENTMCNC: 27.4 PG — SIGNIFICANT CHANGE UP (ref 27–34)
MCHC RBC-ENTMCNC: 31.6 GM/DL — LOW (ref 32–36)
MCHC RBC-ENTMCNC: 31.7 GM/DL — LOW (ref 32–36)
MCV RBC AUTO: 86.4 FL — SIGNIFICANT CHANGE UP (ref 80–100)
MCV RBC AUTO: 86.6 FL — SIGNIFICANT CHANGE UP (ref 80–100)
NITRITE UR-MCNC: NEGATIVE — SIGNIFICANT CHANGE UP
NRBC # BLD: 0 /100 WBCS — SIGNIFICANT CHANGE UP (ref 0–0)
NRBC # BLD: 0 /100 WBCS — SIGNIFICANT CHANGE UP (ref 0–0)
PCP SPEC-MCNC: SIGNIFICANT CHANGE UP
PH UR: 7 — SIGNIFICANT CHANGE UP (ref 5–8)
PHOSPHATE SERPL-MCNC: 3.7 MG/DL — SIGNIFICANT CHANGE UP (ref 2.5–4.5)
PLATELET # BLD AUTO: 242 K/UL — SIGNIFICANT CHANGE UP (ref 150–400)
PLATELET # BLD AUTO: 256 K/UL — SIGNIFICANT CHANGE UP (ref 150–400)
POTASSIUM SERPL-MCNC: 3.8 MMOL/L — SIGNIFICANT CHANGE UP (ref 3.5–5.3)
POTASSIUM SERPL-SCNC: 3.8 MMOL/L — SIGNIFICANT CHANGE UP (ref 3.5–5.3)
PROCALCITONIN SERPL-MCNC: <0.02 NG/ML — SIGNIFICANT CHANGE UP (ref 0.02–0.1)
PROCALCITONIN SERPL-MCNC: <0.02 NG/ML — SIGNIFICANT CHANGE UP (ref 0.02–0.1)
PROLACTIN SERPL-MCNC: 28.1 NG/ML — HIGH (ref 3.4–24.1)
PROT SERPL-MCNC: 6.9 G/DL — SIGNIFICANT CHANGE UP (ref 6–8.3)
PROT UR-MCNC: NEGATIVE — SIGNIFICANT CHANGE UP
RBC # BLD: 4.86 M/UL — SIGNIFICANT CHANGE UP (ref 3.8–5.2)
RBC # BLD: 5.07 M/UL — SIGNIFICANT CHANGE UP (ref 3.8–5.2)
RBC # FLD: 14 % — SIGNIFICANT CHANGE UP (ref 10.3–14.5)
RBC # FLD: 14.2 % — SIGNIFICANT CHANGE UP (ref 10.3–14.5)
SODIUM SERPL-SCNC: 142 MMOL/L — SIGNIFICANT CHANGE UP (ref 135–145)
SP GR SPEC: 1.01 — SIGNIFICANT CHANGE UP (ref 1.01–1.02)
TOTAL CHOLESTEROL/HDL RATIO MEASUREMENT: 3.4 RATIO — SIGNIFICANT CHANGE UP (ref 3.3–7.1)
TRIGL SERPL-MCNC: 134 MG/DL — SIGNIFICANT CHANGE UP (ref 10–149)
TSH SERPL-MCNC: 2.87 UU/ML — SIGNIFICANT CHANGE UP (ref 0.34–4.82)
UROBILINOGEN FLD QL: NEGATIVE — SIGNIFICANT CHANGE UP
VIT B12 SERPL-MCNC: 664 PG/ML — SIGNIFICANT CHANGE UP (ref 232–1245)
WBC # BLD: 7.56 K/UL — SIGNIFICANT CHANGE UP (ref 3.8–10.5)
WBC # BLD: 8.86 K/UL — SIGNIFICANT CHANGE UP (ref 3.8–10.5)
WBC # FLD AUTO: 7.56 K/UL — SIGNIFICANT CHANGE UP (ref 3.8–10.5)
WBC # FLD AUTO: 8.86 K/UL — SIGNIFICANT CHANGE UP (ref 3.8–10.5)

## 2019-10-04 PROCEDURE — 99223 1ST HOSP IP/OBS HIGH 75: CPT

## 2019-10-04 PROCEDURE — 99232 SBSQ HOSP IP/OBS MODERATE 35: CPT | Mod: GC

## 2019-10-04 PROCEDURE — 95819 EEG AWAKE AND ASLEEP: CPT | Mod: 26

## 2019-10-04 RX ORDER — ALPRAZOLAM 0.25 MG
1 TABLET ORAL
Qty: 0 | Refills: 0 | DISCHARGE

## 2019-10-04 RX ORDER — LEVETIRACETAM 250 MG/1
500 TABLET, FILM COATED ORAL EVERY 12 HOURS
Refills: 0 | Status: DISCONTINUED | OUTPATIENT
Start: 2019-10-04 | End: 2019-10-05

## 2019-10-04 RX ORDER — PANTOPRAZOLE SODIUM 20 MG/1
1 TABLET, DELAYED RELEASE ORAL
Qty: 0 | Refills: 0 | DISCHARGE

## 2019-10-04 RX ORDER — SODIUM CHLORIDE 9 MG/ML
1000 INJECTION INTRAMUSCULAR; INTRAVENOUS; SUBCUTANEOUS
Refills: 0 | Status: DISCONTINUED | OUTPATIENT
Start: 2019-10-04 | End: 2019-10-07

## 2019-10-04 RX ADMIN — LEVETIRACETAM 420 MILLIGRAM(S): 250 TABLET, FILM COATED ORAL at 11:42

## 2019-10-04 RX ADMIN — SODIUM CHLORIDE 70 MILLILITER(S): 9 INJECTION INTRAMUSCULAR; INTRAVENOUS; SUBCUTANEOUS at 11:36

## 2019-10-04 RX ADMIN — LEVETIRACETAM 420 MILLIGRAM(S): 250 TABLET, FILM COATED ORAL at 18:46

## 2019-10-04 NOTE — PROGRESS NOTE ADULT - PROBLEM SELECTOR PLAN 1
Recurrent seizure episodes with unclear etiology, not on any anti-seizure medications, multiple episodes in the hospital  Recent MRI and EEG non-conclusive, MS workup neg  Hypoglycemia noted on admission. normal afterwards  no other electrolytes abn on labs  f/u CK normal, LDH, Drug screen negative ,   f/u EEG results  Neurology consulted , Pt started on keppra 500 mg BID  Pt had generlized seizures, with no post ictal confusion.   Had a rapid response in the morning. Seizures broke w/o ativan.

## 2019-10-04 NOTE — PROGRESS NOTE ADULT - SUBJECTIVE AND OBJECTIVE BOX
PGY 1 Note discussed with primary attending    Patient is a 44y old  Female who presents with a chief complaint of Seizure (04 Oct 2019 10:30)      INTERVAL HPI/OVERNIGHT EVENTS: Overnight admitted with seizure episodes    MEDICATIONS  (STANDING):  levETIRAcetam  IVPB 500 milliGRAM(s) IV Intermittent every 12 hours  LORazepam   Injectable 2 milliGRAM(s) IV Push every 10 minutes  sodium chloride 0.9%. 1000 milliLiter(s) (70 mL/Hr) IV Continuous <Continuous>    MEDICATIONS  (PRN):  acetaminophen   Tablet .. 650 milliGRAM(s) Oral every 6 hours PRN Severe Pain (7 - 10)  cyclobenzaprine 5 milliGRAM(s) Oral three times a day PRN Muscle Spasm  gabapentin 300 milliGRAM(s) Oral every 8 hours PRN nerve pain      __________________________________________________  REVIEW OF SYSTEMS:    CONSTITUTIONAL: No fever,   EYES: no acute visual disturbances  NECK: No pain or stiffness  RESPIRATORY: No cough; No shortness of breath  CARDIOVASCULAR: No chest pain, no palpitations  GASTROINTESTINAL: No pain. No nausea or vomiting; No diarrhea   NEUROLOGICAL: No headache or numbness, no tremors  MUSCULOSKELETAL: PAin in Left hand, cyst, pain in B/L legs  GENITOURINARY: no dysuria, no frequency, no hesitancy  PSYCHIATRY: no depression , no anxiety  ALL OTHER  ROS negative        Vital Signs Last 24 Hrs  T(C): 37.1 (04 Oct 2019 14:43), Max: 37.1 (03 Oct 2019 17:08)  T(F): 98.8 (04 Oct 2019 14:43), Max: 98.8 (04 Oct 2019 14:43)  HR: 83 (04 Oct 2019 14:43) (66 - 94)  BP: 127/83 (04 Oct 2019 14:43) (105/73 - 134/80)  BP(mean): 16 (04 Oct 2019 14:43) (16 - 16)  RR: 18 (04 Oct 2019 14:43) (16 - 18)  SpO2: 100% (04 Oct 2019 14:43) (99% - 100%)    ________________________________________________  PHYSICAL EXAM:  GENERAL: NAD  HEENT: Normocephalic;  conjunctivae and sclerae clear; moist mucous membranes;   NECK : supple  CHEST/LUNG: Clear to auscultation bilaterally with good air entry   HEART: S1 S2  regular; no murmurs, gallops or rubs  ABDOMEN: Soft, Nontender, Nondistended; Bowel sounds present  EXTREMITIES: no cyanosis; no edema; no calf tenderness  SKIN: warm and dry; no rash  NERVOUS SYSTEM:  Awake and alert; Oriented  to place, person and time ; no new deficits    _________________________________________________  LABS:                        13.9   8.86  )-----------( 256      ( 04 Oct 2019 09:41 )             43.8     10-04    142  |  107  |  9   ----------------------------<  75  3.8   |  28  |  0.73    Ca    9.0      04 Oct 2019 08:43  Phos  3.7     10-04    TPro  6.9  /  Alb  3.4<L>  /  TBili  1.9<H>  /  DBili  0.3<H>  /  AST  33  /  ALT  49  /  AlkPhos  99  10-04      Urinalysis Basic - ( 04 Oct 2019 00:59 )    Color: Yellow / Appearance: Clear / S.010 / pH: x  Gluc: x / Ketone: Negative  / Bili: Negative / Urobili: Negative   Blood: x / Protein: Negative / Nitrite: Negative   Leuk Esterase: Negative / RBC: x / WBC x   Sq Epi: x / Non Sq Epi: x / Bacteria: x      CAPILLARY BLOOD GLUCOSE      POCT Blood Glucose.: 123 mg/dL (04 Oct 2019 14:19)  POCT Blood Glucose.: 110 mg/dL (04 Oct 2019 11:41)  POCT Blood Glucose.: 78 mg/dL (04 Oct 2019 09:23)  POCT Blood Glucose.: 76 mg/dL (04 Oct 2019 05:22)  POCT Blood Glucose.: 100 mg/dL (03 Oct 2019 19:04)  POCT Blood Glucose.: 65 mg/dL (03 Oct 2019 17:13)        RADIOLOGY & ADDITIONAL TESTS:    Imaging Personally Reviewed:  YES    Consultant(s) Notes Reviewed:   YES    Care Discussed with Consultants :     Plan of care was discussed with patient and /or primary care giver; all questions and concerns were addressed and care was aligned with patient's wishes.

## 2019-10-04 NOTE — PROGRESS NOTE ADULT - PROBLEM SELECTOR PLAN 2
Pt BS in ED 62, within range now  Denies any h/o hypoglycemia in past  Finger stick q 6 for now  F/u c-peptide level

## 2019-10-04 NOTE — CHART NOTE - NSCHARTNOTEFT_GEN_A_CORE
***INCOMPLETE***; see below for preliminary recommendations    Rapid Response PGY 2/ PGY 3 Note  Patient is a 44y old  Female         admitted for   Rapid response team called because    Patient was seen and examined at the bedside by the rapid response team.    Allergies    No Known Allergies    Intolerances        PAST MEDICAL & SURGICAL HISTORY:      Vital Signs Last 24 Hrs  T(C): 36.7 (04 Oct 2019 04:59), Max: 37.1 (03 Oct 2019 17:08)  T(F): 98.1 (04 Oct 2019 04:59), Max: 98.7 (03 Oct 2019 17:08)  HR: 66 (04 Oct 2019 04:59) (66 - 87)  BP: 105/73 (04 Oct 2019 04:59) (105/73 - 126/78)  BP(mean): --  RR: 16 (04 Oct 2019 04:59) (16 - 18)  SpO2: 100% (04 Oct 2019 04:59) (99% - 100%)          GENERAL: The patient is awake and alert in no apparent distress.   HEENT: Head is normocephalic and atraumatic. Extraocular muscles are intact. Mucous membranes are moist. No throat erythema/exudates no lymphadenopathy, no JVD,   NECK: Supple.  LUNGS: Clear to auscultation BL without wheezing, rales or rhonchi; respirations unlabored  HEART: Regular rate and rhythm ,+S1/+S2, no murmurs, rubs, gallops  ABDOMEN: Soft, nontender, and nondistended, no rebound, guarding rigidity, bowel sounds in all 4 quadrants  EXTREMITIES: Without any cyanosis, clubbing, rash, lesions or edema.  SKIN: No new rashes or lesions.  MSK: strength equal BL  VASCULAR: Radial and Dorsal pedal pulses palpable BL  NEUROLOGIC: Grossly intact.  PSYCH: No new changes.                            13.3   7.56  )-----------( 242      ( 04 Oct 2019 08:43 )             42.1     10-04    142  |  107  |  9   ----------------------------<  75  3.8   |  28  |  0.73    Ca    9.0      04 Oct 2019 08:43  Phos  3.7     10-04    TPro  6.9  /  Alb  3.4<L>  /  TBili  1.9<H>  /  DBili  0.3<H>  /  AST  33  /  ALT  49  /  AlkPhos  99  10-04         LIVER FUNCTIONS - ( 04 Oct 2019 08:43 )  Alb: 3.4 g/dL / Pro: 6.9 g/dL / ALK PHOS: 99 U/L / ALT: 49 U/L DA / AST: 33 U/L / GGT: x         Urinalysis Basic - ( 04 Oct 2019 00:59 )    Color: Yellow / Appearance: Clear / S.010 / pH: x  Gluc: x / Ketone: Negative  / Bili: Negative / Urobili: Negative   Blood: x / Protein: Negative / Nitrite: Negative   Leuk Esterase: Negative / RBC: x / WBC x   Sq Epi: x / Non Sq Epi: x / Bacteria: x             Vital Signs Last 24 Hrs*       Assessment- Rapid Response called for 44y year old Female with a past medical history of     Plan-    RRT called for generalized tonic seizures of b/l UEs w/ LOC x 2 mins resolved w/out Ativan w/ 1 minute of postictal confusion then followed w/ complaints of R sided numbness  Patient states similar Hx in the past diagnosed w/ as panic attack  DDx including Psychogenic, Panic Attack, Complex Migraine, and less likely TIA/CVA  ***Recommend Neurology consultation and possible psychiatry consultation; sending Prolactin and Lactate ***INCOMPLETE***; see below for preliminary recommendations    Rapid Response PGY 2/ PGY 3 Note  Patient is a 44y old  Female         admitted for   Rapid response team called because    Patient was seen and examined at the bedside by the rapid response team.    Allergies    No Known Allergies    Intolerances        PAST MEDICAL & SURGICAL HISTORY:      Vital Signs Last 24 Hrs  T(C): 36.7 (04 Oct 2019 04:59), Max: 37.1 (03 Oct 2019 17:08)  T(F): 98.1 (04 Oct 2019 04:59), Max: 98.7 (03 Oct 2019 17:08)  HR: 66 (04 Oct 2019 04:59) (66 - 87)  BP: 105/73 (04 Oct 2019 04:59) (105/73 - 126/78)  BP(mean): --  RR: 16 (04 Oct 2019 04:59) (16 - 18)  SpO2: 100% (04 Oct 2019 04:59) (99% - 100%)          GENERAL: The patient is awake and alert in no apparent distress.   HEENT: Head is normocephalic and atraumatic. Extraocular muscles are intact. Mucous membranes are moist. No throat erythema/exudates no lymphadenopathy, no JVD,   NECK: Supple.  LUNGS: Clear to auscultation BL without wheezing, rales or rhonchi; respirations unlabored  HEART: Regular rate and rhythm ,+S1/+S2, no murmurs, rubs, gallops  ABDOMEN: Soft, nontender, and nondistended, no rebound, guarding rigidity, bowel sounds in all 4 quadrants  EXTREMITIES: Without any cyanosis, clubbing, rash, lesions or edema.  SKIN: No new rashes or lesions.  MSK: strength equal BL  VASCULAR: Radial and Dorsal pedal pulses palpable BL  NEUROLOGIC: Grossly intact.  PSYCH: No new changes.                            13.3   7.56  )-----------( 242      ( 04 Oct 2019 08:43 )             42.1     10-04    142  |  107  |  9   ----------------------------<  75  3.8   |  28  |  0.73    Ca    9.0      04 Oct 2019 08:43  Phos  3.7     10-04    TPro  6.9  /  Alb  3.4<L>  /  TBili  1.9<H>  /  DBili  0.3<H>  /  AST  33  /  ALT  49  /  AlkPhos  99  10-04         LIVER FUNCTIONS - ( 04 Oct 2019 08:43 )  Alb: 3.4 g/dL / Pro: 6.9 g/dL / ALK PHOS: 99 U/L / ALT: 49 U/L DA / AST: 33 U/L / GGT: x         Urinalysis Basic - ( 04 Oct 2019 00:59 )    Color: Yellow / Appearance: Clear / S.010 / pH: x  Gluc: x / Ketone: Negative  / Bili: Negative / Urobili: Negative   Blood: x / Protein: Negative / Nitrite: Negative   Leuk Esterase: Negative / RBC: x / WBC x   Sq Epi: x / Non Sq Epi: x / Bacteria: x             Vital Signs Last 24 Hrs*       Assessment- Rapid Response called for 44y year old Female with a past medical history of     Plan-    RRT called for generalized tonic seizures of b/l UEs w/ LOC x 2 mins resolved w/out Ativan w/ 1 minute of postictal confusion then followed w/ complaints of R sided numbness  Patient states similar Hx in the past diagnosed w/ as panic attack  DDx including Psychogenic, GTCS, Panic Attack, Complex Migraine, and less likely TIA/CVA  ***Recommend Neurology consultation and possible psychiatry consultation; sending Prolactin and Lactate Rapid Response PGY 2/ PGY 3 Note    43 y/o F PMH Asthma, Charcot Zoey Foot Disease, and Hx of Seizure disorder (not on any AED) p/w witnessed episode of seizure during PT - long Hx x , sees neurologist and rheumatologist outpatient - as per witness tonic clonic seizure w/ biting lips but no bowel/bladder incontinence. Rapid response team called because GTC seizure, 2 mins, minimally responsive to very painful stimuli, FS WNLs, b/l UEs, no tongue biting, and resolved w/out Ativan - afterwards c/o R sided numbness but AAOx3 within 5 minutes, minimal to no postictal confusion.    Allergies; No Known Allergies and Intolerances    PAST MEDICAL & SURGICAL HISTORY: as above    Vital Signs Last 24 Hrs  T(C): 36.7 (04 Oct 2019 04:59), Max: 37.1 (03 Oct 2019 17:08)  T(F): 98.1 (04 Oct 2019 04:59), Max: 98.7 (03 Oct 2019 17:08)  HR: 66 (04 Oct 2019 04:59) (66 - 87)  BP: 105/73 (04 Oct 2019 04:59) (105/73 - 126/78)  BP(mean): --  RR: 16 (04 Oct 2019 04:59) (16 - 18)  SpO2: 100% (04 Oct 2019 04:59) (99% - 100%)    GENERAL: The patient is awake and alert in no apparent distress.   HEENT: Head is normocephalic and atraumatic. Extraocular muscles are intact. Mucous membranes are moist. No throat erythema/exudates no lymphadenopathy, no JVD,   NECK: Supple.  LUNGS: Clear to auscultation BL without wheezing, rales or rhonchi; respirations unlabored  HEART: Regular rate and rhythm ,+S1/+S2, no murmurs, rubs, gallops  ABDOMEN: Soft, nontender, and nondistended, no rebound, guarding rigidity, bowel sounds in all 4 quadrants  EXTREMITIES: Without any cyanosis, clubbing, rash, lesions or edema.  SKIN: No new rashes or lesions.  MSK: strength equal BL  VASCULAR: Radial and Dorsal pedal pulses palpable BL  NEUROLOGIC: Grossly intact; moves all extremities, decreased STR b/l LEs, persistent R sided numbness  PSYCH: No new changes.                        13.3   7.56  )-----------( 242      ( 04 Oct 2019 08:43 )             42.1     10    142  |  107  |  9   ----------------------------<  75  3.8   |  28  |  0.73    Ca    9.0      04 Oct 2019 08:43  Phos  3.7     10-    TPro  6.9  /  Alb  3.4<L>  /  TBili  1.9<H>  /  DBili  0.3<H>  /  AST  33  /  ALT  49  /  AlkPhos  99  10       LIVER FUNCTIONS - ( 04 Oct 2019 08:43 )  Alb: 3.4 g/dL / Pro: 6.9 g/dL / ALK PHOS: 99 U/L / ALT: 49 U/L DA / AST: 33 U/L / GGT: x         Urinalysis Basic - ( 04 Oct 2019 00:59 )    Color: Yellow / Appearance: Clear / S.010 / pH: x  Gluc: x / Ketone: Negative  / Bili: Negative / Urobili: Negative   Blood: x / Protein: Negative / Nitrite: Negative   Leuk Esterase: Negative / RBC: x / WBC x   Sq Epi: x / Non Sq Epi: x / Bacteria: x    Lactate, Blood (10.04.19 @ 09:41)    Lactate, Blood: 5.3: TYPE:(C=Critical, N=Notification, A=Abnormal) C  TESTS: LACTATE  DATE/TIME CALLED: 10/04/19 10:07,  CALLED TO: ANAY GRIGGS, RN  READ BACK (2 Patient Identifiers)(Y/N): Y  READ BACK VALUES (Y/N): Y  CALLED BY: LI, 10/04/19 10:11  FLOOR NOTIFIED AS PER CMSSEPSIS/SEPTIC PROTOCOL mmol/L      Assessment  43 y/o F PMH Asthma, Charcot Zoey Foot Disease, and Hx of Seizure disorder (not on any AED) p/w witnessed episode of seizure during PT - RRT called for GTC seizure, 2 mins, minimally responsive to very painful stimuli, FS WNLs, b/l UEs, no tongue biting, and resolved w/out Ativan - afterwards c/o R sided numbness but AAOx3 within 5 minutes, minimal to no postictal confusion - DDx including Psychogenic, GTCS, Panic Attack, Complex Migraine, and less likely TIA/CVA    Plan  - Neurology consultation; Dr Sánchez, f/u her recommendations  - Giving loading dose of Keppra given multiple Sz recently  - F/u Prolactin; Lactate elevation often associated w/ Sz, can trend a routine lactate, no need for IVF bolus as no concerns for sepsis

## 2019-10-04 NOTE — CONSULT NOTE ADULT - ASSESSMENT
Impression:  Episodes of LOC concerning for epileptic vs non epileptic events    Recommendations:  1.        Chest Xray  2.         EEG  3.         Anti-epileptic: Keppra 500mg bid.  Can be continues if the EEG shows epileptic events.  If the EEG is unremarkable, then continue Keppra until the patient has long term monitoring (LTM) and see and epilepsy specialist at [phone].  4. Seizure and fall precautions  5.        The patient has been advised that driving is not allowed for 1 year after a seizure by [place] law (she does not drive).  The patient is advised to avoid swimming and any activities that may put their life at danger shall they have a seizure.    6.        DVT PPx  dw Dr. Kat    Thank you for the courtesy of this consult.

## 2019-10-04 NOTE — PROGRESS NOTE ADULT - PROBLEM SELECTOR PLAN 4
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

## 2019-10-04 NOTE — CONSULT NOTE ADULT - SUBJECTIVE AND OBJECTIVE BOX
****TEMPLATE ONLY****      Patient is a 44y old  Female who presents with a chief complaint of Seizure (03 Oct 2019 20:57)      HPI:  Pt is a 44 yr old female with PMH of asthma, CMF disease, extensive seizure h/o not on any anti-seizure medications was brought in for witnessed episode of seizure while she was in her PT session. Pt states that she has been having seizures with initial episodes in , underwent workup and was not started on any anti-seizure medications coming in for generalized tonic clonic seizure. As per patient she had 5-6 seizures daily for 1 year in  that improved but she continues to have seizures. Pt follows neurologist and rheumatologist as out-patient. Today in PT session she states that she felt dizzy and some vision changes and then she doesn't remember. As per daughter she had a tonic clonic seizure, pt was biting lips, no bowel/bladder incontinence noted. Pt also complaint of dull headache. Pt denies any chest pain, syncope, neck pain, fevers at home, ill contact, new medication, N/V/D. Pt had a MRI and EEG done in 2019 that was negative. Pt denies smoking, alcohol or drug usage. 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 (03 Oct 2019 20:57)         The seizure is described as  Seizure onset at  The frequency of seizure is  The seizure is brought up by  The patient has been previously on     History of head trauma/ concussion:  History of meningitis:  History of febrile seizures:  Family history of seizures:    Neurological Review of Systems:  No difficulty with language.  No vision loss or double vision.  No dizziness, vertigo or new hearing loss.  No difficulty with speech or swallowing.  No focal weakness.  No focal sensory changes.  No numbness or tingling in the bilateral lower extremities.  No difficulty with balance.  No difficulty with ambulation.        MEDICATIONS  (STANDING):  levETIRAcetam  IVPB 500 milliGRAM(s) IV Intermittent every 12 hours  LORazepam   Injectable 2 milliGRAM(s) IV Push every 10 minutes    MEDICATIONS  (PRN):  acetaminophen   Tablet .. 650 milliGRAM(s) Oral every 6 hours PRN Severe Pain (7 - 10)  cyclobenzaprine 5 milliGRAM(s) Oral three times a day PRN Muscle Spasm  gabapentin 300 milliGRAM(s) Oral every 8 hours PRN nerve pain    Allergies    No Known Allergies    Intolerances      PAST MEDICAL & SURGICAL HISTORY:    FAMILY HISTORY:    SOCIAL HISTORY: non smoker/ former smoker/ active smoker    Review of Systems:  Constitutional: No generalized weakness. No fevers or chills.                    Eyes, Ears, Mouth, Throat: No vision loss   Respiratory: No shortness of breath or cough.                                Cardiovascular: No chest pain or palpitations  Gastrointestinal: No nausea or vomiting.                                         Genitourinary: No urinary incontinence or burning on urination.  Musculoskeletal: No joint pain.                                                           Dermatologic: No rash.  Neurological: as per HPI                                                                      Psychiatric: No behavioral problems.  Endocrine: No known hypoglycemia.               Hematologic/Lymphatic: No easy bleeding.    O:  Vital Signs Last 24 Hrs  T(C): 36.7 (04 Oct 2019 04:59), Max: 37.1 (03 Oct 2019 17:08)  T(F): 98.1 (04 Oct 2019 04:59), Max: 98.7 (03 Oct 2019 17:08)  HR: 66 (04 Oct 2019 04:59) (66 - 87)  BP: 105/73 (04 Oct 2019 04:59) (105/73 - 126/78)  BP(mean): --  RR: 16 (04 Oct 2019 04:59) (16 - 18)  SpO2: 100% (04 Oct 2019 04:59) (99% - 100%)    General Exam:   General appearance: No acute distress                 Cardiovascular: Pedal dorsalis pulses intact bilaterally    Mental Status: Orientated to self, date and place.  Attention intact.  No dysarthria, aphasia or neglect.  Knowledge intact.  Registration intact.  Short and long term memory grossly intact.      Cranial Nerves: CN I - not tested.  PERRL, EOMI, VFF, no nystagmus or diplopia.  No APD.  Fundi not visualized.  CN V1-3 intact to light touch and pinprick.  No facial asymmetry.  Hearing intact to finger rub bilaterally.  Tongue, uvula and palate midline.  Sternocleidomastoid and Trapezius intact bilaterally.    Motor:   Tone: normal.                  Strength intact throughout  No pronator drift bilaterally                      No dysmetria on finger-nose-finger or heel-shin-heel  No truncal ataxia.  No resting, postural or action tremor.  No myoclonus.    Sensation: intact to light touch, pinprick, vibration and proprioception    Deep Tendon Reflexes: 1+ bilateral biceps, triceps, brachioradialis, knee and ankle  Toes flexor bilaterally    Gait: normal and stable.  [place] -[place].    Other:     LABS:                        13.9   8.86  )-----------( 256      ( 04 Oct 2019 09:41 )             43.8     10-    142  |  107  |  9   ----------------------------<  75  3.8   |  28  |  0.73    Ca    9.0      04 Oct 2019 08:43  Phos  3.7     10-    TPro  6.9  /  Alb  3.4<L>  /  TBili  1.9<H>  /  DBili  0.3<H>  /  AST  33  /  ALT  49  /  AlkPhos  99  10-      Urinalysis Basic - ( 04 Oct 2019 00:59 )    Color: Yellow / Appearance: Clear / S.010 / pH: x  Gluc: x / Ketone: Negative  / Bili: Negative / Urobili: Negative   Blood: x / Protein: Negative / Nitrite: Negative   Leuk Esterase: Negative / RBC: x / WBC x   Sq Epi: x / Non Sq Epi: x / Bacteria: x   Utox -[place]      RADIOLOGY & ADDITIONAL STUDIES:    EKG:      < from: CT Head No Cont (10.03.19 @ 18:42) >  IMPRESSION: No evidence of an acute transcortical infarction or   hemorrhage. MR is a more sensitive imaging modality for theevaluation of   an acute infarction.     < end of copied text >      Impression:         Recommendations:  1.        Chest Xray  4.         EEG  5.         check level of  6.         Anti-epileptic:  7.         Please give Ativan 2mg for active seizure, can give another 2mg dose of Ativan if the seizure continues or re-occurs, for a maximum of 6mg Ativan in 24 hours.  8.         9.         Seizure and fall precautions  10.        The patient has been advised that driving is not allowed for 1 year after a seizure by [place] law.  The patient is advised to avoid swimming and any activities that may put their life at danger shall they have a seizure.    11.        DVT PPx    Thank you for the courtesy of this consult. Patient is a 44y old  Female who presents with a chief complaint of Seizure (03 Oct 2019 20:57)    HPI:  Pt is a 44 yr old female with PMH of asthma, CMF disease, extensive seizure h/o not on any anti-seizure medications was brought in for witnessed episode of seizure while she was in her PT session, neurology called for seizures.  Patient unable to clarify what happened to her, she says that she had been having episodes of LOC since  without bowel or bladder incontinence tongue bite or foaming at the mouth.  She was told that she has panic attacks but she has had LTM.  In the hospital, the patient had a rapid resppnse this morning for generalized body shaking, not tonic clonic as per the resident, without bowel or bladder incontinence foaming at the mouth or tongue bite.  The patient regained consciousness rapidly within less than 1min.    Neurological Review of Systems:  No difficulty with language.  No vision loss or double vision.  No dizziness, vertigo or new hearing loss.  No difficulty with speech or swallowing.  No focal weakness.  Unclear if has focal sensory changes.   No difficulty with balance.  No difficulty with ambulation.        MEDICATIONS  (STANDING):  levETIRAcetam  IVPB 500 milliGRAM(s) IV Intermittent every 12 hours  LORazepam   Injectable 2 milliGRAM(s) IV Push every 10 minutes    MEDICATIONS  (PRN):  acetaminophen   Tablet .. 650 milliGRAM(s) Oral every 6 hours PRN Severe Pain (7 - 10)  cyclobenzaprine 5 milliGRAM(s) Oral three times a day PRN Muscle Spasm  gabapentin 300 milliGRAM(s) Oral every 8 hours PRN nerve pain    Allergies    No Known Allergies    Intolerances      PAST MEDICAL & SURGICAL HISTORY:    FAMILY HISTORY: daughter and granddaughter with febrile seizures    SOCIAL HISTORY: non smoker    Review of Systems:  Constitutional: No fevers.                    Eyes, Ears, Mouth, Throat: No vision loss   Respiratory: No cough.                                Cardiovascular: No chest pain  Gastrointestinal: No vomiting.                                         Genitourinary: No urinary incontinence.  Musculoskeletal: No joint pain.                                                           Dermatologic: No rash.  Neurological: as per HPI                                                                      Psychiatric: Patient declines anxiety, depression and behavioral problems.  Endocrine: No known hypoglycemia.               Hematologic/Lymphatic: No easy bleeding.    O:  Vital Signs Last 24 Hrs  T(C): 36.7 (04 Oct 2019 04:59), Max: 37.1 (03 Oct 2019 17:08)  T(F): 98.1 (04 Oct 2019 04:59), Max: 98.7 (03 Oct 2019 17:08)  HR: 66 (04 Oct 2019 04:59) (66 - 87)  BP: 105/73 (04 Oct 2019 04:59) (105/73 - 126/78)  BP(mean): --  RR: 16 (04 Oct 2019 04:59) (16 - 18)  SpO2: 100% (04 Oct 2019 04:59) (99% - 100%)    General Exam:   General appearance: No acute distress                 Cardiovascular: Pedal dorsalis pulses intact bilaterally    Mental Status: Orientated to self, date and place.  Attention intact.  No dysarthria, aphasia or neglect.  Knowledge intact.  Registration intact.  Short and long term memory grossly intact.      Cranial Nerves: CN I - not tested.  PERRL, EOMI, VFF, no nystagmus or diplopia.  No APD.  Fundi not visualized.  CN V1-3 sensation decreased on the right side to light touch, patient splints vibration at the forehead.  No facial asymmetry.  Hearing intact to finger rub bilaterally.  Tongue, uvula and palate midline.  Sternocleidomastoid and Trapezius intact bilaterally.    Motor:   Tone: normal.                  Strength intact throughout  No pronator drift bilaterally                      No dysmetria on finger-nose-finger or heel-shin-heel    Sensation: intact to light touch    Deep Tendon Reflexes: 1+ bilateral biceps, triceps, brachioradialis, knee and ankle  Toes flexor bilaterally    Gait: patient declines    Other:     LABS:                        13.9   8.86  )-----------( 256      ( 04 Oct 2019 09:41 )             43.8     10-04    142  |  107  |  9   ----------------------------<  75  3.8   |  28  |  0.73    Ca    9.0      04 Oct 2019 08:43  Phos  3.7     10-04    TPro  6.9  /  Alb  3.4<L>  /  TBili  1.9<H>  /  DBili  0.3<H>  /  AST  33  /  ALT  49  /  AlkPhos  99  10-04      Urinalysis Basic - ( 04 Oct 2019 00:59 )    Color: Yellow / Appearance: Clear / S.010 / pH: x  Gluc: x / Ketone: Negative  / Bili: Negative / Urobili: Negative   Blood: x / Protein: Negative / Nitrite: Negative   Leuk Esterase: Negative / RBC: x / WBC x   Sq Epi: x / Non Sq Epi: x / Bacteria: x   Utox -[place]      RADIOLOGY & ADDITIONAL STUDIES:    EKG:       < from: CT Head No Cont (10.03.19 @ 18:42) > (images reviwed)  IMPRESSION: No evidence of an acute transcortical infarction or   hemorrhage. MR is a more sensitive imaging modality for theevaluation of   an acute infarction.     < end of copied text >

## 2019-10-05 DIAGNOSIS — F43.22 ADJUSTMENT DISORDER WITH ANXIETY: ICD-10-CM

## 2019-10-05 LAB
24R-OH-CALCIDIOL SERPL-MCNC: 19.1 NG/ML — LOW (ref 30–80)
ALBUMIN SERPL ELPH-MCNC: 3 G/DL — LOW (ref 3.5–5)
ALP SERPL-CCNC: 89 U/L — SIGNIFICANT CHANGE UP (ref 40–120)
ALT FLD-CCNC: 39 U/L DA — SIGNIFICANT CHANGE UP (ref 10–60)
ANION GAP SERPL CALC-SCNC: 6 MMOL/L — SIGNIFICANT CHANGE UP (ref 5–17)
AST SERPL-CCNC: 22 U/L — SIGNIFICANT CHANGE UP (ref 10–40)
BASOPHILS # BLD AUTO: 0.04 K/UL — SIGNIFICANT CHANGE UP (ref 0–0.2)
BASOPHILS NFR BLD AUTO: 0.6 % — SIGNIFICANT CHANGE UP (ref 0–2)
BILIRUB SERPL-MCNC: 1.1 MG/DL — SIGNIFICANT CHANGE UP (ref 0.2–1.2)
BUN SERPL-MCNC: 10 MG/DL — SIGNIFICANT CHANGE UP (ref 7–18)
CALCIUM SERPL-MCNC: 8.6 MG/DL — SIGNIFICANT CHANGE UP (ref 8.4–10.5)
CHLORIDE SERPL-SCNC: 109 MMOL/L — HIGH (ref 96–108)
CO2 SERPL-SCNC: 26 MMOL/L — SIGNIFICANT CHANGE UP (ref 22–31)
CREAT SERPL-MCNC: 0.66 MG/DL — SIGNIFICANT CHANGE UP (ref 0.5–1.3)
CULTURE RESULTS: SIGNIFICANT CHANGE UP
EOSINOPHIL # BLD AUTO: 0.13 K/UL — SIGNIFICANT CHANGE UP (ref 0–0.5)
EOSINOPHIL NFR BLD AUTO: 1.8 % — SIGNIFICANT CHANGE UP (ref 0–6)
GLUCOSE BLDC GLUCOMTR-MCNC: 116 MG/DL — HIGH (ref 70–99)
GLUCOSE BLDC GLUCOMTR-MCNC: 75 MG/DL — SIGNIFICANT CHANGE UP (ref 70–99)
GLUCOSE SERPL-MCNC: 91 MG/DL — SIGNIFICANT CHANGE UP (ref 70–99)
HCT VFR BLD CALC: 39.4 % — SIGNIFICANT CHANGE UP (ref 34.5–45)
HGB BLD-MCNC: 12.4 G/DL — SIGNIFICANT CHANGE UP (ref 11.5–15.5)
IMM GRANULOCYTES NFR BLD AUTO: 0.7 % — SIGNIFICANT CHANGE UP (ref 0–1.5)
LYMPHOCYTES # BLD AUTO: 2.44 K/UL — SIGNIFICANT CHANGE UP (ref 1–3.3)
LYMPHOCYTES # BLD AUTO: 34.7 % — SIGNIFICANT CHANGE UP (ref 13–44)
MCHC RBC-ENTMCNC: 27.7 PG — SIGNIFICANT CHANGE UP (ref 27–34)
MCHC RBC-ENTMCNC: 31.5 GM/DL — LOW (ref 32–36)
MCV RBC AUTO: 88.1 FL — SIGNIFICANT CHANGE UP (ref 80–100)
MONOCYTES # BLD AUTO: 0.76 K/UL — SIGNIFICANT CHANGE UP (ref 0–0.9)
MONOCYTES NFR BLD AUTO: 10.8 % — SIGNIFICANT CHANGE UP (ref 2–14)
NEUTROPHILS # BLD AUTO: 3.61 K/UL — SIGNIFICANT CHANGE UP (ref 1.8–7.4)
NEUTROPHILS NFR BLD AUTO: 51.4 % — SIGNIFICANT CHANGE UP (ref 43–77)
NRBC # BLD: 0 /100 WBCS — SIGNIFICANT CHANGE UP (ref 0–0)
PLATELET # BLD AUTO: 224 K/UL — SIGNIFICANT CHANGE UP (ref 150–400)
POTASSIUM SERPL-MCNC: 4.2 MMOL/L — SIGNIFICANT CHANGE UP (ref 3.5–5.3)
POTASSIUM SERPL-SCNC: 4.2 MMOL/L — SIGNIFICANT CHANGE UP (ref 3.5–5.3)
PROT SERPL-MCNC: 6.4 G/DL — SIGNIFICANT CHANGE UP (ref 6–8.3)
RBC # BLD: 4.47 M/UL — SIGNIFICANT CHANGE UP (ref 3.8–5.2)
RBC # FLD: 14 % — SIGNIFICANT CHANGE UP (ref 10.3–14.5)
SODIUM SERPL-SCNC: 141 MMOL/L — SIGNIFICANT CHANGE UP (ref 135–145)
SPECIMEN SOURCE: SIGNIFICANT CHANGE UP
WBC # BLD: 7.03 K/UL — SIGNIFICANT CHANGE UP (ref 3.8–10.5)
WBC # FLD AUTO: 7.03 K/UL — SIGNIFICANT CHANGE UP (ref 3.8–10.5)

## 2019-10-05 PROCEDURE — 99221 1ST HOSP IP/OBS SF/LOW 40: CPT

## 2019-10-05 PROCEDURE — 99232 SBSQ HOSP IP/OBS MODERATE 35: CPT | Mod: GC

## 2019-10-05 PROCEDURE — 76700 US EXAM ABDOM COMPLETE: CPT | Mod: 26

## 2019-10-05 RX ORDER — SIMETHICONE 80 MG/1
80 TABLET, CHEWABLE ORAL ONCE
Refills: 0 | Status: COMPLETED | OUTPATIENT
Start: 2019-10-05 | End: 2019-10-05

## 2019-10-05 RX ADMIN — LEVETIRACETAM 420 MILLIGRAM(S): 250 TABLET, FILM COATED ORAL at 06:21

## 2019-10-05 RX ADMIN — SIMETHICONE 80 MILLIGRAM(S): 80 TABLET, CHEWABLE ORAL at 06:22

## 2019-10-05 NOTE — BEHAVIORAL HEALTH ASSESSMENT NOTE - NSBHCHARTREVIEWVS_PSY_A_CORE FT
ICU Vital Signs Last 24 Hrs  T(C): 37 (05 Oct 2019 13:42), Max: 37 (05 Oct 2019 13:42)  T(F): 98.6 (05 Oct 2019 13:42), Max: 98.6 (05 Oct 2019 13:42)  HR: 87 (05 Oct 2019 13:42) (63 - 87)  BP: 109/75 (05 Oct 2019 13:42) (97/63 - 109/75)  BP(mean): --  ABP: --  ABP(mean): --  RR: 17 (05 Oct 2019 13:42) (16 - 17)  SpO2: 98% (05 Oct 2019 13:42) (98% - 99%)

## 2019-10-05 NOTE — BEHAVIORAL HEALTH ASSESSMENT NOTE - RISK ASSESSMENT
Low Acute Suicide Risk Pt is a low risk for suicide given she has no prior attempts, no current SI I/P, is future-oriented, no family h/o suicide, and denies any drug use

## 2019-10-05 NOTE — EEG REPORT - NS EEG TEXT BOX
[facility]   COMPREHENSIVE EPILEPSY CENTER   REPORT OF ROUTINE VIDEO EEG     [facility]: [address], [place] [address], Ph#: [phone]  [facility]: [id number], [place] [address], Ph#: [phone]  Office: [address] [place] [address], [facility] [address], [place] [address] Ph#: [phone]    Patient Name: LAURIE CHO  Age and : 44y ([birth year])  MRN #: [id number]  Location: [place] [address] A  Referring Physician: Kelsey Kat    Study Date: 10-04-19    _____________________________________________________________  TECHNICAL INFORMATION    Placement and Labeling of Electrodes:  The EEG was performed utilizing 20 channels referential EEG connections (coronal over temporal over parasagittal montage) using all standard 10-20 electrode placements with EKG.  Recording was at a sampling rate of 256 samples per second per channel.  Time synchronized digital video recording was done simultaneously with EEG recording.  A low light infrared camera was used for low light recording.  Issa and seizure detection algorithms were utilized.    _____________________________________________________________  HISTORY    Patient is a 44y old  Female who presents with a chief complaint of Seizure (04 Oct 2019 15:05)      PERTINENT MEDICATION:  MEDICATIONS  (STANDING):  levETIRAcetam  IVPB 500 milliGRAM(s) IV Intermittent every 12 hours  LORazepam   Injectable 2 milliGRAM(s) IV Push every 10 minutes  sodium chloride 0.9%. 1000 milliLiter(s) (70 mL/Hr) IV Continuous <Continuous>    _____________________________________________________________  STUDY INTERPRETATION    Findings: The background was continuous, spontaneously variable and reactive. During wakefulness, the posterior dominant rhythm consisted of symmetric, well-modulated 10 Hz activity, with amplitude to 30 uV, that attenuated to eye opening.  Low amplitude frontal beta was noted in wakefulness.    Background Slowing:  No generalized background slowing was present.    Focal Slowing:   None was present.    Sleep Background:  Sleep and drowsiness was not recorded.    Other Non-Epileptiform Findings:  None were present.    Interictal Epileptiform Activity:   None were present.    Events:  -Patient had multiple episodes/events during the recording with no associated EEG changes (movement artifact was seen during events).  Video not available for review.    Activation Procedures:   Hyperventilation was performed and did not elicit any abnormality.  Photic stimulation was performed and did not elicit any abnormality.     Artifacts:  Intermittent myogenic and movement artifacts were noted.    ECG:  The heart rate on single channel ECG was predominantly between 60-80 BPM.    _____________________________________________________________  EEG SUMMARY/CLASSIFICATION  Normal in the awake state.  _____________________________________________________________  EEG IMPRESSION/CLINICAL CORRELATE  Normal EEG study.  Patient had multiple episodes/events during the recording with no associated EEG changes (movement artifact was seen during events).  Video not available for review.    Troy Choe MD  EEG / Epilepsy Attending Physician

## 2019-10-05 NOTE — PROGRESS NOTE ADULT - SUBJECTIVE AND OBJECTIVE BOX
PGY 1 Note discussed with primary attending    Patient is a 44y old  Female who presents with a chief complaint of Seizure (04 Oct 2019 15:05)      INTERVAL HPI/OVERNIGHT EVENTS: recurrent episodes of seizures  MEDICATIONS  (STANDING):  levETIRAcetam  IVPB 500 milliGRAM(s) IV Intermittent every 12 hours  LORazepam   Injectable 2 milliGRAM(s) IV Push every 10 minutes  sodium chloride 0.9%. 1000 milliLiter(s) (70 mL/Hr) IV Continuous <Continuous>    MEDICATIONS  (PRN):  acetaminophen   Tablet .. 650 milliGRAM(s) Oral every 6 hours PRN Severe Pain (7 - 10)  cyclobenzaprine 5 milliGRAM(s) Oral three times a day PRN Muscle Spasm  gabapentin 300 milliGRAM(s) Oral every 8 hours PRN nerve pain      __________________________________________________  REVIEW OF SYSTEMS:    CONSTITUTIONAL: No fever,   EYES: no acute visual disturbances  NECK: No pain or stiffness  RESPIRATORY: No cough; No shortness of breath  CARDIOVASCULAR: No chest pain, no palpitations  GASTROINTESTINAL: No pain. No nausea or vomiting; No diarrhea   NEUROLOGICAL: No headache or numbness, no tremors  MUSCULOSKELETAL: No joint pain, no muscle pain  GENITOURINARY: no dysuria, no frequency, no hesitancy  PSYCHIATRY: no depression , no anxiety  ALL OTHER  ROS negative        Vital Signs Last 24 Hrs  T(C): 36.5 (05 Oct 2019 04:55), Max: 37.1 (04 Oct 2019 14:43)  T(F): 97.7 (05 Oct 2019 04:55), Max: 98.8 (04 Oct 2019 14:43)  HR: 63 (05 Oct 2019 04:55) (63 - 94)  BP: 109/74 (05 Oct 2019 04:55) (97/63 - 134/80)  BP(mean): 16 (04 Oct 2019 14:43) (16 - 16)  RR: 16 (05 Oct 2019 04:55) (16 - 18)  SpO2: 99% (05 Oct 2019 04:55) (98% - 100%)    ________________________________________________  PHYSICAL EXAM:  GENERAL: NAD  HEENT: Normocephalic;  conjunctivae and sclerae clear; moist mucous membranes;   NECK : supple  CHEST/LUNG: Clear to auscultation bilaterally with good air entry   HEART: S1 S2 regular; no murmurs, gallops or rubs  ABDOMEN: Soft, Nontender, Nondistended; Bowel sounds present  EXTREMITIES: no cyanosis; no edema; no calf tenderness  SKIN: warm and dry; no rash  NERVOUS SYSTEM:  Awake and alert; Oriented  to place, person and time ; no new deficits    _________________________________________________  LABS:                        12.4   7.03  )-----------( 224      ( 05 Oct 2019 07:12 )             39.4     10-    141  |  109<H>  |  10  ----------------------------<  91  4.2   |  26  |  0.66    Ca    8.6      05 Oct 2019 07:12  Phos  3.7     10-    TPro  6.4  /  Alb  3.0<L>  /  TBili  1.1  /  DBili  x   /  AST  22  /  ALT  39  /  AlkPhos  89  10-05      Urinalysis Basic - ( 04 Oct 2019 00:59 )    Color: Yellow / Appearance: Clear / S.010 / pH: x  Gluc: x / Ketone: Negative  / Bili: Negative / Urobili: Negative   Blood: x / Protein: Negative / Nitrite: Negative   Leuk Esterase: Negative / RBC: x / WBC x   Sq Epi: x / Non Sq Epi: x / Bacteria: x      CAPILLARY BLOOD GLUCOSE      POCT Blood Glucose.: 75 mg/dL (05 Oct 2019 09:51)  POCT Blood Glucose.: 123 mg/dL (04 Oct 2019 14:19)  POCT Blood Glucose.: 110 mg/dL (04 Oct 2019 11:41)        RADIOLOGY & ADDITIONAL TESTS:    Imaging Personally Reviewed:  YES/NO    Consultant(s) Notes Reviewed:   YES/ No    Care Discussed with Consultants :     Plan of care was discussed with patient and /or primary care giver; all questions and concerns were addressed and care was aligned with patient's wishes. PGY 1 Note discussed with primary attending    Patient is a 44y old  Female who presents with a chief complaint of Seizure (04 Oct 2019 15:05)      INTERVAL HPI/OVERNIGHT EVENTS: recurrent episodes of shaking    MEDICATIONS  (STANDING):  levETIRAcetam  IVPB 500 milliGRAM(s) IV Intermittent every 12 hours  LORazepam   Injectable 2 milliGRAM(s) IV Push every 10 minutes  sodium chloride 0.9%. 1000 milliLiter(s) (70 mL/Hr) IV Continuous <Continuous>    MEDICATIONS  (PRN):  acetaminophen   Tablet .. 650 milliGRAM(s) Oral every 6 hours PRN Severe Pain (7 - 10)  cyclobenzaprine 5 milliGRAM(s) Oral three times a day PRN Muscle Spasm  gabapentin 300 milliGRAM(s) Oral every 8 hours PRN nerve pain      __________________________________________________  REVIEW OF SYSTEMS:    CONSTITUTIONAL: No fever,   EYES: no acute visual disturbances  NECK: No pain or stiffness  RESPIRATORY: No cough; No shortness of breath  CARDIOVASCULAR: No chest pain, no palpitations  GASTROINTESTINAL: No pain. No nausea or vomiting; No diarrhea   NEUROLOGICAL: No headache or numbness, no tremors  MUSCULOSKELETAL: No joint pain, no muscle pain  GENITOURINARY: no dysuria, no frequency, no hesitancy  PSYCHIATRY: no depression , no anxiety  ALL OTHER  ROS negative        Vital Signs Last 24 Hrs  T(C): 36.5 (05 Oct 2019 04:55), Max: 37.1 (04 Oct 2019 14:43)  T(F): 97.7 (05 Oct 2019 04:55), Max: 98.8 (04 Oct 2019 14:43)  HR: 63 (05 Oct 2019 04:55) (63 - 94)  BP: 109/74 (05 Oct 2019 04:55) (97/63 - 134/80)  BP(mean): 16 (04 Oct 2019 14:43) (16 - 16)  RR: 16 (05 Oct 2019 04:55) (16 - 18)  SpO2: 99% (05 Oct 2019 04:55) (98% - 100%)    ________________________________________________  PHYSICAL EXAM:  GENERAL: NAD  HEENT: Normocephalic;  conjunctivae and sclerae clear; moist mucous membranes;   NECK : supple  CHEST/LUNG: Clear to auscultation bilaterally with good air entry   HEART: S1 S2 regular; no murmurs, gallops or rubs  ABDOMEN: Soft, Nontender, Nondistended; Bowel sounds present  EXTREMITIES: no cyanosis; no edema; no calf tenderness  SKIN: warm and dry; no rash  NERVOUS SYSTEM:  Awake and alert; Oriented  to place, person and time ; no new deficits    _________________________________________________  LABS:                        12.4   7.03  )-----------( 224      ( 05 Oct 2019 07:12 )             39.4     10-    141  |  109<H>  |  10  ----------------------------<  91  4.2   |  26  |  0.66    Ca    8.6      05 Oct 2019 07:12  Phos  3.7     10-    TPro  6.4  /  Alb  3.0<L>  /  TBili  1.1  /  DBili  x   /  AST  22  /  ALT  39  /  AlkPhos  89  10-05      Urinalysis Basic - ( 04 Oct 2019 00:59 )    Color: Yellow / Appearance: Clear / S.010 / pH: x  Gluc: x / Ketone: Negative  / Bili: Negative / Urobili: Negative   Blood: x / Protein: Negative / Nitrite: Negative   Leuk Esterase: Negative / RBC: x / WBC x   Sq Epi: x / Non Sq Epi: x / Bacteria: x      CAPILLARY BLOOD GLUCOSE      POCT Blood Glucose.: 75 mg/dL (05 Oct 2019 09:51)  POCT Blood Glucose.: 123 mg/dL (04 Oct 2019 14:19)  POCT Blood Glucose.: 110 mg/dL (04 Oct 2019 11:41)        RADIOLOGY & ADDITIONAL TESTS:    Imaging Personally Reviewed:  YES/NO    Consultant(s) Notes Reviewed:   YES/ No    Care Discussed with Consultants :     Plan of care was discussed with patient and /or primary care giver; all questions and concerns were addressed and care was aligned with patient's wishes.

## 2019-10-05 NOTE — BEHAVIORAL HEALTH ASSESSMENT NOTE - SUICIDE PROTECTIVE FACTORS
Supportive social network of family or friends/Responsibility to family and others/Ability to cope with stress/Engaged in work or school/Identifies reasons for living/Has future plans

## 2019-10-05 NOTE — PROGRESS NOTE ADULT - PROBLEM SELECTOR PLAN 3
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

## 2019-10-05 NOTE — BEHAVIORAL HEALTH ASSESSMENT NOTE - SUMMARY
This is a 43 y/o  female who has no prior psychiatric treatment, no prior inpatient admissions, no h/o SA, who was admitted to [facility] for witnessed seizure like activity. Pt had several episodes in the hospital of tonic clonic arm movements but her EEG was negative and the episodes resolved without medication. 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 Keppra is on hold. Pt had multiple stressors over the past year but states that they have resolved at this time. She does not meet criteria for MDD or anxiety at this time. She is not an acute danger to herself or others and does not meet criteria for inpatient psychiatric hospitalization. Pt did mention on interview that she may require to go on medical disability if these episodes continue (which could qualify for secondary gain).

## 2019-10-05 NOTE — CHART NOTE - NSCHARTNOTEFT_GEN_A_CORE
LAURIE CHO  [id number]  [facility] [address]  Female  44y               Rapid Response PGY 2/ PGY 3 Note             Patient is a 45 y/o F PMH Asthma, Charcot Zoey Foot Disease, and Hx of Seizure disorder (not on any AED)  p/w witnessed episode of seizure during PT - long Hx x , sees neurologist and rheumatologist outpatient - as per witness tonic clonic seizure w/ biting lips but no bowel/bladder incontinence.     -Rapid response was  called for GTC seizure,  about 3 minutes during Ultrasound of abdomen.  Patient was seen and examined at the bedside by the rapid response team. Pt had about 3 minutes of jerky movements of bilateral upper limbs. Seizure resolved itself without any medication. There was no post ictal confusion. FS and vitals WNL. Pt had no complain after the event. She mentioned that she had  sudden black out and does not remember the event.         Allergies    No Known Allergies    Intolerances        PAST MEDICAL & SURGICAL HISTORY: Asthma, Charcot Zoey Foot Disease, and Hx of Seizure disorder      Vital Signs Last 24 Hrs  T(C): 36.5 (05 Oct 2019 04:55), Max: 37.1 (04 Oct 2019 14:43)  T(F): 97.7 (05 Oct 2019 04:55), Max: 98.8 (04 Oct 2019 14:43)  HR: 63 (05 Oct 2019 04:55) (63 - 94)  BP: 109/74 (05 Oct 2019 04:55) (97/63 - 134/80)  BP(mean): 16 (04 Oct 2019 14:43) (16 - 16)  RR: 16 (05 Oct 2019 04:55) (16 - 18)  SpO2: 99% (05 Oct 2019 04:55) (98% - 100%)          GENERAL: The patient is awake and alert in no apparent distress.   HEENT: Head is normocephalic and atraumatic. Extraocular muscles are intact. Mucous membranes are moist. No throat erythema/exudates no lymphadenopathy, no JVD,   NECK: Supple.  LUNGS: Clear to auscultation BL without wheezing, rales or rhonchi; respirations unlabored  HEART: Regular rate and rhythm ,+S1/+S2, no murmurs, rubs, gallops  ABDOMEN: Soft, nontender, and nondistended, no rebound, guarding rigidity, bowel sounds in all 4 quadrants  EXTREMITIES: Without any cyanosis, clubbing, rash, lesions or edema.  SKIN: No new rashes or lesions.  MSK: strength equal BL  VASCULAR: Radial and Dorsal pedal pulses palpable BL  NEUROLOGIC: Grossly intact.  PSYCH: No new changes.      10-04 @ 07:01  -  1005 @ 07:00  --------------------------------------------------------  IN: 560 mL / OUT: 500 mL / NET: 60 mL                                12.4   7.03  )-----------( 224      ( 05 Oct 2019 07:12 )             39.4     10-05    141  |  109<H>  |  10  ----------------------------<  91  4.2   |  26  |  0.66    Ca    8.6      05 Oct 2019 07:12  Phos  3.7     10-04    TPro  6.4  /  Alb  3.0<L>  /  TBili  1.1  /  DBili  x   /  AST  22  /  ALT  39  /  AlkPhos  89  10-05         LIVER FUNCTIONS - ( 05 Oct 2019 07:12 )  Alb: 3.0 g/dL / Pro: 6.4 g/dL / ALK PHOS: 89 U/L / ALT: 39 U/L DA / AST: 22 U/L / GGT: x         Urinalysis Basic - ( 04 Oct 2019 00:59 )    Color: Yellow / Appearance: Clear / S.010 / pH: x  Gluc: x / Ketone: Negative  / Bili: Negative / Urobili: Negative   Blood: x / Protein: Negative / Nitrite: Negative   Leuk Esterase: Negative / RBC: x / WBC x   Sq Epi: x / Non Sq Epi: x / Bacteria: x             Vital Signs Last 24 Hrs*   B.P: 114/78  HR:81  T:98.6  SPO2:99%      Assessment- Rapid Response called for 44y year old Female with a past medical history of Charcot Zoey Foot Disease, and Hx of Seizure disorder.  Pt had brief period of seizure like jerky movements of bilateral upper limbs for about 3 minutes which resolved by itself. Less likely real seizure as no post ictal confusion and and seizure resolved by itself.   -Pt was fully awake and alert immediately after the jerky movements stopped. Possible anxiety and psychiatry component.   -Pt had multiple events during EEG with not EETG findings  -No acute infarct or hemorrhage on recent CT head.  -Pt had similar episode last night which resolved by itself and started on loading dose of keppra.  -Prolactin level was elevated to 28  -Lactate WNL    Plan:-  -Continue Keppra  -F/u Neurology recommendations  -Seizure precaution LAURIE CHO  [id number]  [facility] [address]  Female  44y               Rapid Response PGY 2/ PGY 3 Note             Patient is a 43 y/o F PMH Asthma, Charcot Zoey Foot Disease, and Hx of Seizure disorder (not on any AED)  p/w witnessed episode of seizure during PT - long Hx x , sees neurologist and rheumatologist outpatient - as per witness tonic clonic seizure w/ biting lips but no bowel/bladder incontinence.     -Rapid response was  called for seizure like episode,  about 3 minutes during Ultrasound of abdomen.  Patient was seen and examined at the bedside by the rapid response team. Pt had about 3 minutes of jerky movements of bilateral upper limbs. Seizure resolved itself without any medication. There was no post ictal confusion. FS and vitals WNL. Pt had no complain after the event. She mentioned that she had  sudden black out and does not remember the event.         Allergies    No Known Allergies    Intolerances        PAST MEDICAL & SURGICAL HISTORY: Asthma, Charcot Zoey Foot Disease, and Hx of Seizure disorder      Vital Signs Last 24 Hrs  T(C): 36.5 (05 Oct 2019 04:55), Max: 37.1 (04 Oct 2019 14:43)  T(F): 97.7 (05 Oct 2019 04:55), Max: 98.8 (04 Oct 2019 14:43)  HR: 63 (05 Oct 2019 04:55) (63 - 94)  BP: 109/74 (05 Oct 2019 04:55) (97/63 - 134/80)  BP(mean): 16 (04 Oct 2019 14:43) (16 - 16)  RR: 16 (05 Oct 2019 04:55) (16 - 18)  SpO2: 99% (05 Oct 2019 04:55) (98% - 100%)          GENERAL: The patient is awake and alert in no apparent distress.   HEENT: Head is normocephalic and atraumatic. Extraocular muscles are intact. Mucous membranes are moist. No throat erythema/exudates no lymphadenopathy, no JVD,   NECK: Supple.  LUNGS: Clear to auscultation BL without wheezing, rales or rhonchi; respirations unlabored  HEART: Regular rate and rhythm ,+S1/+S2, no murmurs, rubs, gallops  ABDOMEN: Soft, nontender, and nondistended, no rebound, guarding rigidity, bowel sounds in all 4 quadrants  EXTREMITIES: Without any cyanosis, clubbing, rash, lesions or edema.  SKIN: No new rashes or lesions.  MSK: strength equal BL  VASCULAR: Radial and Dorsal pedal pulses palpable BL  NEUROLOGIC: Grossly intact.  PSYCH: No new changes.      10-04 @ 07:01  -  1005 @ 07:00  --------------------------------------------------------  IN: 560 mL / OUT: 500 mL / NET: 60 mL                                12.4   7.03  )-----------( 224      ( 05 Oct 2019 07:12 )             39.4     10-05    141  |  109<H>  |  10  ----------------------------<  91  4.2   |  26  |  0.66    Ca    8.6      05 Oct 2019 07:12  Phos  3.7     10-04    TPro  6.4  /  Alb  3.0<L>  /  TBili  1.1  /  DBili  x   /  AST  22  /  ALT  39  /  AlkPhos  89  10-05         LIVER FUNCTIONS - ( 05 Oct 2019 07:12 )  Alb: 3.0 g/dL / Pro: 6.4 g/dL / ALK PHOS: 89 U/L / ALT: 39 U/L DA / AST: 22 U/L / GGT: x         Urinalysis Basic - ( 04 Oct 2019 00:59 )    Color: Yellow / Appearance: Clear / S.010 / pH: x  Gluc: x / Ketone: Negative  / Bili: Negative / Urobili: Negative   Blood: x / Protein: Negative / Nitrite: Negative   Leuk Esterase: Negative / RBC: x / WBC x   Sq Epi: x / Non Sq Epi: x / Bacteria: x             Vital Signs Last 24 Hrs*   B.P: 114/78  HR:81  T:98.6  SPO2:99%      Assessment- Rapid Response called for 44y year old Female with a past medical history of Charcot Zoey Foot Disease, and Hx of Seizure disorder.  Pt had brief period of seizure like jerky movements of bilateral upper limbs for about 3 minutes which resolved by itself. Less likely real seizure as no post ictal confusion and and seizure resolved by itself.   -Pt was fully awake and alert immediately after the jerky movements stopped. Possible anxiety and psychiatry component.   -Pt had multiple events during EEG with not EETG findings  -No acute infarct or hemorrhage on recent CT head.  -Pt had similar episode last night which resolved by itself and started on loading dose of keppra.  -Prolactin level was elevated to 28  -Lactate WNL    Plan:-  -Continue Keppra  -F/u Neurology recommendations  -Seizure precaution LAURIE CHO  [id number]  [facility] [address]  Female  44y               Rapid Response PGY 2/ PGY 3 Note             Patient is a 45 y/o F PMH Asthma, Charcot Zoey Foot Disease, and Hx of Seizure disorder (not on any AED)  p/w witnessed episode of seizure during PT - long Hx x , sees neurologist and rheumatologist outpatient - as per witness tonic clonic seizure w/ biting lips but no bowel/bladder incontinence.     -Rapid response was  called for seizure like episode,  about 3 minutes during Ultrasound of abdomen.  Patient was seen and examined at the bedside by the rapid response team. Pt had about 3 minutes of jerky movements of bilateral upper limbs. Seizure resolved itself without any medication. There was no post ictal confusion. FS and vitals WNL. Pt had no complain after the event. She mentioned that she had  sudden black out and does not remember the event.         Allergies    No Known Allergies    Intolerances        PAST MEDICAL & SURGICAL HISTORY: Asthma, Charcot Zoey Foot Disease, and Hx of Seizure disorder      Vital Signs Last 24 Hrs  T(C): 36.5 (05 Oct 2019 04:55), Max: 37.1 (04 Oct 2019 14:43)  T(F): 97.7 (05 Oct 2019 04:55), Max: 98.8 (04 Oct 2019 14:43)  HR: 63 (05 Oct 2019 04:55) (63 - 94)  BP: 109/74 (05 Oct 2019 04:55) (97/63 - 134/80)  BP(mean): 16 (04 Oct 2019 14:43) (16 - 16)  RR: 16 (05 Oct 2019 04:55) (16 - 18)  SpO2: 99% (05 Oct 2019 04:55) (98% - 100%)          GENERAL: The patient is awake and alert in no apparent distress.   HEENT: Head is normocephalic and atraumatic. Extraocular muscles are intact. Mucous membranes are moist. No throat erythema/exudates no lymphadenopathy, no JVD,   NECK: Supple.  LUNGS: Clear to auscultation BL without wheezing, rales or rhonchi; respirations unlabored  HEART: Regular rate and rhythm ,+S1/+S2, no murmurs, rubs, gallops  ABDOMEN: Soft, nontender, and nondistended, no rebound, guarding rigidity, bowel sounds in all 4 quadrants  EXTREMITIES: Without any cyanosis, clubbing, rash, lesions or edema.  SKIN: No new rashes or lesions.  MSK: strength equal BL  VASCULAR: Radial and Dorsal pedal pulses palpable BL  NEUROLOGIC: Grossly intact.  PSYCH: No new changes.      10-04 @ 07:01  -  1005 @ 07:00  --------------------------------------------------------  IN: 560 mL / OUT: 500 mL / NET: 60 mL                                12.4   7.03  )-----------( 224      ( 05 Oct 2019 07:12 )             39.4     10-05    141  |  109<H>  |  10  ----------------------------<  91  4.2   |  26  |  0.66    Ca    8.6      05 Oct 2019 07:12  Phos  3.7     10-04    TPro  6.4  /  Alb  3.0<L>  /  TBili  1.1  /  DBili  x   /  AST  22  /  ALT  39  /  AlkPhos  89  10-05         LIVER FUNCTIONS - ( 05 Oct 2019 07:12 )  Alb: 3.0 g/dL / Pro: 6.4 g/dL / ALK PHOS: 89 U/L / ALT: 39 U/L DA / AST: 22 U/L / GGT: x         Urinalysis Basic - ( 04 Oct 2019 00:59 )    Color: Yellow / Appearance: Clear / S.010 / pH: x  Gluc: x / Ketone: Negative  / Bili: Negative / Urobili: Negative   Blood: x / Protein: Negative / Nitrite: Negative   Leuk Esterase: Negative / RBC: x / WBC x   Sq Epi: x / Non Sq Epi: x / Bacteria: x             Vital Signs Last 24 Hrs*   B.P: 114/78  HR:81  T:98.6  SPO2:99%      Assessment- Rapid Response called for 44y year old Female with a past medical history of Charcot Zoey Foot Disease, and Hx of Seizure disorder.  Pt had brief period of seizure like jerky movements of bilateral upper limbs for about 3 minutes which resolved by itself. Less likely real seizure as no post ictal confusion and and seizure resolved by itself.   -Pt was fully awake and alert immediately after the jerky movements stopped. Possible anxiety and psychiatry component.   -Pt had multiple events during EEG without  EEG findings  -No acute infarct or hemorrhage on recent CT head.  -Pt had similar episode last night which resolved by itself and started on loading dose of keppra.  -Prolactin level was elevated to 28  -Lactate WNL    Plan:-  -Continue Keppra  -F/u Neurology recommendations  -Seizure precaution LAURIE CHO  [id number]  [facility] [address]  Female  44y               Rapid Response PGY 2/ PGY 3 Note             Patient is a 43 y/o F PMH Asthma, Charcot Zoey Foot Disease, and Hx of Seizure disorder (not on any AED)  p/w witnessed episode of seizure during PT - long Hx x , sees neurologist and rheumatologist outpatient - as per witness tonic clonic seizure w/ biting lips but no bowel/bladder incontinence.     -Rapid response was  called for seizure like episode,  about 3 minutes during Ultrasound of abdomen.  Patient was seen and examined at the bedside by the rapid response team. Pt had about 3 minutes of jerky movements of bilateral upper limbs. Seizure resolved itself without any medication. There was no post ictal confusion. FS and vitals WNL. Pt had no complain after the event. She mentioned that she had  sudden black out and does not remember the event.         Allergies    No Known Allergies    Intolerances        PAST MEDICAL & SURGICAL HISTORY: Asthma, Charcot Zoey Foot Disease, and Hx of Seizure disorder      Vital Signs Last 24 Hrs  T(C): 36.5 (05 Oct 2019 04:55), Max: 37.1 (04 Oct 2019 14:43)  T(F): 97.7 (05 Oct 2019 04:55), Max: 98.8 (04 Oct 2019 14:43)  HR: 63 (05 Oct 2019 04:55) (63 - 94)  BP: 109/74 (05 Oct 2019 04:55) (97/63 - 134/80)  BP(mean): 16 (04 Oct 2019 14:43) (16 - 16)  RR: 16 (05 Oct 2019 04:55) (16 - 18)  SpO2: 99% (05 Oct 2019 04:55) (98% - 100%)          GENERAL: The patient is awake and alert in no apparent distress.   HEENT: Head is normocephalic and atraumatic. Extraocular muscles are intact. Mucous membranes are moist. No throat erythema/exudates no lymphadenopathy, no JVD,   NECK: Supple.  LUNGS: Clear to auscultation BL without wheezing, rales or rhonchi; respirations unlabored  HEART: Regular rate and rhythm ,+S1/+S2, no murmurs, rubs, gallops  ABDOMEN: Soft, nontender, and nondistended, no rebound, guarding rigidity, bowel sounds in all 4 quadrants  EXTREMITIES: Without any cyanosis, clubbing, rash, lesions or edema.  SKIN: No new rashes or lesions.  MSK: strength equal BL  VASCULAR: Radial and Dorsal pedal pulses palpable BL  NEUROLOGIC: Grossly intact.  PSYCH: No new changes.      10-04 @ 07:01  -  1005 @ 07:00  --------------------------------------------------------  IN: 560 mL / OUT: 500 mL / NET: 60 mL                                12.4   7.03  )-----------( 224      ( 05 Oct 2019 07:12 )             39.4     10-05    141  |  109<H>  |  10  ----------------------------<  91  4.2   |  26  |  0.66    Ca    8.6      05 Oct 2019 07:12  Phos  3.7     10-04    TPro  6.4  /  Alb  3.0<L>  /  TBili  1.1  /  DBili  x   /  AST  22  /  ALT  39  /  AlkPhos  89  10-05         LIVER FUNCTIONS - ( 05 Oct 2019 07:12 )  Alb: 3.0 g/dL / Pro: 6.4 g/dL / ALK PHOS: 89 U/L / ALT: 39 U/L DA / AST: 22 U/L / GGT: x         Urinalysis Basic - ( 04 Oct 2019 00:59 )    Color: Yellow / Appearance: Clear / S.010 / pH: x  Gluc: x / Ketone: Negative  / Bili: Negative / Urobili: Negative   Blood: x / Protein: Negative / Nitrite: Negative   Leuk Esterase: Negative / RBC: x / WBC x   Sq Epi: x / Non Sq Epi: x / Bacteria: x             Vital Signs Last 24 Hrs*   B.P: 114/78  HR:81  T:98.6  SPO2:99%      Assessment- Rapid Response called for 44y year old Female with a past medical history of Charcot Zoey Foot Disease, and Hx of Seizure disorder.  Pt had brief period of seizure like jerky movements of bilateral upper limbs for about 3 minutes which resolved by itself. Less likely real seizure as no post ictal confusion and and seizure resolved by itself.   -Pt was fully awake and alert immediately after the jerky movements stopped. Possible anxiety and psychiatry component.   -Pt had multiple events during EEG without  EEG findings  -No acute infarct or hemorrhage on recent CT head.  -Pt had similar episode yesterday which resolved by itself and started on loading dose of keppra.  -Prolactin level was elevated to 28  -Lactate WNL    Plan:-  -Continue Keppra  -F/u Neurology recommendations  -Seizure precaution

## 2019-10-05 NOTE — BEHAVIORAL HEALTH ASSESSMENT NOTE - NSBHCHARTREVIEWLAB_PSY_A_CORE FT
12.4   7.03  )-----------( 224      ( 05 Oct 2019 07:12 )             39.4     10-05    141  |  109<H>  |  10  ----------------------------<  91  4.2   |  26  |  0.66    Ca    8.6      05 Oct 2019 07:12  Phos  3.7     10-04    TPro  6.4  /  Alb  3.0<L>  /  TBili  1.1  /  DBili  x   /  AST  22  /  ALT  39  /  AlkPhos  89  10-05

## 2019-10-05 NOTE — PROGRESS NOTE ADULT - PROBLEM SELECTOR PLAN 1
Recurrent seizure episodes with unclear etiology,  multiple episodes in the hospital  Rapid called in the morning when pt went for her USG abdomen,. Seizure broke without ativan, Pt back to base line. No post ictal confusion.  Recent MRI and EEG non-conclusive, MS workup neg  no other electrolytes abn on labs  f/u CK normal, LDH, Drug screen negative ,   f/u EEG normal  Neurology consulted , Pt started on keppra 500 mg BID  Psych Dr. Melendez consulted for possible pain seeking/Conversion disorder/ Munchausen ? Recurrent seizure episodes with unclear etiology,  multiple episodes in the hospital  Rapid called in the morning when pt went for her USG abdomen,. Seizure broke without ativan, Pt back to base line. No post ictal confusion.  Recent MRI and EEG non-conclusive, MS workup neg  no other electrolytes abn on labs  f/u CK normal, LDH, Drug screen negative ,   f/u EEG normal  Neurology consulted , Pt started on keppra 500 mg BID  Psych Dr. Melendez consulted for possible pain seeking/Conversion disorder/ Munchausen ?  with holding keppra as per neuro recommendations

## 2019-10-05 NOTE — BEHAVIORAL HEALTH ASSESSMENT NOTE - NSBHCONSULTFOLLOWAFTERCARE_PSY_A_CORE FT
Pt may present to the [facility] upon discharge [phone] for outpatient treatment. Psychotherapy is recommended to r/o conversion disorder vs somatization disorder.
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
## 2019-10-05 NOTE — BEHAVIORAL HEALTH ASSESSMENT NOTE - DETAILS
none daughter- PTSD, daughter- panic attacks, PGF- Alzheimer's dementia, father- early dementia neck pain feels "brain freeze" after seizure

## 2019-10-05 NOTE — PROGRESS NOTE ADULT - PROBLEM SELECTOR PLAN 2
Pt BS in ED 62, within range now  Denies any h/o hypoglycemia in past  Finger stick q8 for now  F/u c-peptide level : Normal

## 2019-10-06 LAB
ALBUMIN SERPL ELPH-MCNC: 3.1 G/DL — LOW (ref 3.5–5)
ALP SERPL-CCNC: 91 U/L — SIGNIFICANT CHANGE UP (ref 40–120)
ALT FLD-CCNC: 34 U/L DA — SIGNIFICANT CHANGE UP (ref 10–60)
ANION GAP SERPL CALC-SCNC: 7 MMOL/L — SIGNIFICANT CHANGE UP (ref 5–17)
AST SERPL-CCNC: 19 U/L — SIGNIFICANT CHANGE UP (ref 10–40)
BILIRUB SERPL-MCNC: 0.8 MG/DL — SIGNIFICANT CHANGE UP (ref 0.2–1.2)
BUN SERPL-MCNC: 13 MG/DL — SIGNIFICANT CHANGE UP (ref 7–18)
CALCIUM SERPL-MCNC: 8.7 MG/DL — SIGNIFICANT CHANGE UP (ref 8.4–10.5)
CHLORIDE SERPL-SCNC: 110 MMOL/L — HIGH (ref 96–108)
CO2 SERPL-SCNC: 24 MMOL/L — SIGNIFICANT CHANGE UP (ref 22–31)
CREAT SERPL-MCNC: 0.62 MG/DL — SIGNIFICANT CHANGE UP (ref 0.5–1.3)
GLUCOSE BLDC GLUCOMTR-MCNC: 88 MG/DL — SIGNIFICANT CHANGE UP (ref 70–99)
GLUCOSE BLDC GLUCOMTR-MCNC: 93 MG/DL — SIGNIFICANT CHANGE UP (ref 70–99)
GLUCOSE BLDC GLUCOMTR-MCNC: 98 MG/DL — SIGNIFICANT CHANGE UP (ref 70–99)
GLUCOSE SERPL-MCNC: 92 MG/DL — SIGNIFICANT CHANGE UP (ref 70–99)
HCT VFR BLD CALC: 40.7 % — SIGNIFICANT CHANGE UP (ref 34.5–45)
HGB BLD-MCNC: 13 G/DL — SIGNIFICANT CHANGE UP (ref 11.5–15.5)
MCHC RBC-ENTMCNC: 27.7 PG — SIGNIFICANT CHANGE UP (ref 27–34)
MCHC RBC-ENTMCNC: 31.9 GM/DL — LOW (ref 32–36)
MCV RBC AUTO: 86.6 FL — SIGNIFICANT CHANGE UP (ref 80–100)
NRBC # BLD: 0 /100 WBCS — SIGNIFICANT CHANGE UP (ref 0–0)
PLATELET # BLD AUTO: 236 K/UL — SIGNIFICANT CHANGE UP (ref 150–400)
POTASSIUM SERPL-MCNC: 3.9 MMOL/L — SIGNIFICANT CHANGE UP (ref 3.5–5.3)
POTASSIUM SERPL-SCNC: 3.9 MMOL/L — SIGNIFICANT CHANGE UP (ref 3.5–5.3)
PROT SERPL-MCNC: 6.8 G/DL — SIGNIFICANT CHANGE UP (ref 6–8.3)
RBC # BLD: 4.7 M/UL — SIGNIFICANT CHANGE UP (ref 3.8–5.2)
RBC # FLD: 13.6 % — SIGNIFICANT CHANGE UP (ref 10.3–14.5)
SODIUM SERPL-SCNC: 141 MMOL/L — SIGNIFICANT CHANGE UP (ref 135–145)
WBC # BLD: 9.05 K/UL — SIGNIFICANT CHANGE UP (ref 3.8–10.5)
WBC # FLD AUTO: 9.05 K/UL — SIGNIFICANT CHANGE UP (ref 3.8–10.5)

## 2019-10-06 PROCEDURE — 99232 SBSQ HOSP IP/OBS MODERATE 35: CPT

## 2019-10-06 NOTE — PROGRESS NOTE ADULT - ATTENDING COMMENTS
Patient seen/evaluated at bedside on 10/5/19. I agree with the resident progress note/outlined plan of care. My independent findings and conclusions are documented.    Recurrent episode of shaking DURING EEG yesterday with RRT and ativan administration which patient attributes to exposure to lights during the EEG testing. Her EEG results recorded the shaking events as MOTION artifact. Pt reports she had her first seizure after 1 year when her provider sent her for an EEG about one month ago. She believes she seized twice today in the setting of lights flashing in her eyes during the EEG procedure again. Pt denies nausea/vomiting/ HA. fevers/chills/ photophobia, neck stiffness, dysuria, rash. She states about 1 year at [facility] she had similar complaints but was informed she had anxiety and given xanax. She has a daughter who has a history of seizures.  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 possible seizure  2. possible pseudoseizures  3. charcot lev tooth muscular atrophy  4. Gilbert syndrome    advised to dc Keppra by neurology.  Though there is some concern that clinically not all her abnormal motions are consistent with true seizure activity, GTC event remains on the differential.   will reach out to her rheumatologist as patient appears to have an established relationship w/ this provider. Pt reports she is searching for a new neurologist   aspiration precautions  -f/u  neurology recommendations re: pseudoseizures  psychiatry consult  -physical therapy consult
Patient seen/evaluated at bedside on 10/4/19. I agree with the resident progress note/outlined plan of care. My independent findings and conclusions are documented.    Recurrent episode of shaking during EEG today with RRT and ativan administration, possible seizure vs. pseudoseizure and/or both. Pt reports she had her first seizure after 1 year when her provider sent her for an EEG about one month ago. She believes she seized twice today in the setting of lights flashing in her eyes during the EEG procedure again. Pt denies nausea/vomiting/ HA. fevers/chills/ photophobia, neck stiffness, dysuria, rash. She states about 1 year at [facility] she had similar complaints but was informed she had anxiety and given xanax. She has a daughter who has a history of seizures.  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 possible seizure  2. possible pseudoseizures  3. charcot lev tooth muscular atrophy  4. Gilbert syndrome    empiric management with keppra 500mg bid. Though there is some concern that clinically not all her abnormal motions are consistent with true seizure activity, GTC event remains on the differential. Elevated lactate also noted on admission  seizure and aspiration precautions  -f/u EEG and neurology recommendations  -physical therapy consult
Patient seen/evaluated at bedside on 10/5/19. I agree with the resident progress note/outlined plan of care. My independent findings and conclusions are documented.    Recurrent episode of shaking DURING EEG yesterday with RRT and ativan administration which patient attributes to exposure to lights during the EEG testing. Her EEG results recorded the shaking events as MOTION artifact. Pt reports she had her first seizure after 1 year when her provider sent her for an EEG about one month ago. She believes she seized twice today in the setting of lights flashing in her eyes during the EEG procedure again. Pt denies nausea/vomiting/ HA. fevers/chills/ photophobia, neck stiffness, dysuria, rash. She states about 1 year at [facility] she had similar complaints but was informed she had anxiety and given xanax. She has a daughter who has a history of seizures.  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 possible seizure  2. possible pseudoseizures  3. charcot lev tooth muscular atrophy  4. Gilbert syndrome    advised to dc Keppra by neurology.  Though there is some concern that clinically not all her abnormal motions are consistent with true seizure activity, GTC event remains on the differential.   will reach out to her rheumatologist as patient appears to have an established relationship w/ this provider. Pt reports she is searching for a new neurologist   aspiration precautions  -f/u  neurology recommendations re: pseudoseizures  psychiatry consult  -physical therapy consult

## 2019-10-06 NOTE — PROGRESS NOTE ADULT - SUBJECTIVE AND OBJECTIVE BOX
Patient is a 44y old  Female who presents with a chief complaint of Seizure (05 Oct 2019 10:36)      INTERVAL HPI/OVERNIGHT EVENTS: no new complaints    MEDICATIONS  (STANDING):  LORazepam   Injectable 2 milliGRAM(s) IV Push every 10 minutes  sodium chloride 0.9%. 1000 milliLiter(s) (70 mL/Hr) IV Continuous <Continuous>    MEDICATIONS  (PRN):  acetaminophen   Tablet .. 650 milliGRAM(s) Oral every 6 hours PRN Severe Pain (7 - 10)  cyclobenzaprine 5 milliGRAM(s) Oral three times a day PRN Muscle Spasm  gabapentin 300 milliGRAM(s) Oral every 8 hours PRN nerve pain      __________________________________________________  REVIEW OF SYSTEMS:    CONSTITUTIONAL: No fever,   EYES: no acute visual disturbances  NECK: No pain or stiffness  RESPIRATORY: No cough; No shortness of breath  CARDIOVASCULAR: No chest pain, no palpitations  GASTROINTESTINAL: No pain. No nausea or vomiting; No diarrhea   NEUROLOGICAL: No headache or numbness, no tremors  MUSCULOSKELETAL: No joint pain, no muscle pain  GENITOURINARY: no dysuria, no frequency, no hesitancy  PSYCHIATRY: no depression , no anxiety  ALL OTHER  ROS negative        Vital Signs Last 24 Hrs  T(C): 36.8 (06 Oct 2019 13:13), Max: 36.8 (05 Oct 2019 20:30)  T(F): 98.3 (06 Oct 2019 13:13), Max: 98.3 (05 Oct 2019 20:30)  HR: 79 (06 Oct 2019 13:13) (71 - 81)  BP: 108/76 (06 Oct 2019 13:13) (94/61 - 114/77)  BP(mean): --  RR: 19 (06 Oct 2019 13:13) (15 - 19)  SpO2: 99% (06 Oct 2019 13:13) (98% - 100%)    ________________________________________________  PHYSICAL EXAM:  GENERAL: NAD  HEENT: Normocephalic;  conjunctivae and sclerae clear; moist mucous membranes;   NECK : supple  CHEST/LUNG: Clear to auscultation bilaterally with good air entry   HEART: S1 S2  regular; no murmurs, gallops or rubs  ABDOMEN: Soft, Nontender, Nondistended; Bowel sounds present  EXTREMITIES: no cyanosis; no edema; no calf tenderness  SKIN: warm and dry; no rash  NERVOUS SYSTEM:  Awake and alert; Oriented  to place, person and time ; no new deficits    _________________________________________________  LABS:                        13.0   9.05  )-----------( 236      ( 06 Oct 2019 07:35 )             40.7     10-06    141  |  110<H>  |  13  ----------------------------<  92  3.9   |  24  |  0.62    Ca    8.7      06 Oct 2019 07:35    TPro  6.8  /  Alb  3.1<L>  /  TBili  0.8  /  DBili  x   /  AST  19  /  ALT  34  /  AlkPhos  91  10-06        CAPILLARY BLOOD GLUCOSE      POCT Blood Glucose.: 88 mg/dL (06 Oct 2019 12:12)  POCT Blood Glucose.: 93 mg/dL (06 Oct 2019 05:50)  POCT Blood Glucose.: 116 mg/dL (05 Oct 2019 23:30)        RADIOLOGY & ADDITIONAL TESTS:    Imaging Personally Reviewed:  YES/NO    Consultant(s) Notes Reviewed:   YES/ No    Care Discussed with Consultants :     Plan of care was discussed with patient and /or primary care giver; all questions and concerns were addressed and care was aligned with patient's wishes.

## 2019-10-06 NOTE — PROGRESS NOTE ADULT - PROBLEM SELECTOR PLAN 1
Recurrent seizure episodes with unclear etiology,  multiple episodes in the hospital  Rapid called in the morning when pt went for her USG abdomen,. Seizure broke without ativan, Pt back to base line. No post ictal confusion.  Recent MRI and EEG non-conclusive, MS workup neg  no other electrolytes abn on labs  f/u CK normal, LDH, Drug screen negative ,   f/u EEG normal  Neurology consulted , Pt started on keppra 500 mg BID  Psych Dr. Melendez consulted for possible pain seeking/Conversion disorder/ [place]unchausen ?  with holding keppra as per neuro recommendations

## 2019-10-07 ENCOUNTER — TRANSCRIPTION ENCOUNTER (OUTPATIENT)
Age: 44
End: 2019-10-07

## 2019-10-07 VITALS
TEMPERATURE: 98 F | OXYGEN SATURATION: 100 % | DIASTOLIC BLOOD PRESSURE: 85 MMHG | HEART RATE: 85 BPM | RESPIRATION RATE: 16 BRPM | SYSTOLIC BLOOD PRESSURE: 123 MMHG

## 2019-10-07 LAB
ALBUMIN SERPL ELPH-MCNC: 3.1 G/DL — LOW (ref 3.5–5)
ALP SERPL-CCNC: 93 U/L — SIGNIFICANT CHANGE UP (ref 40–120)
ALT FLD-CCNC: 36 U/L DA — SIGNIFICANT CHANGE UP (ref 10–60)
ANION GAP SERPL CALC-SCNC: 4 MMOL/L — LOW (ref 5–17)
AST SERPL-CCNC: 21 U/L — SIGNIFICANT CHANGE UP (ref 10–40)
BILIRUB SERPL-MCNC: 0.8 MG/DL — SIGNIFICANT CHANGE UP (ref 0.2–1.2)
BUN SERPL-MCNC: 17 MG/DL — SIGNIFICANT CHANGE UP (ref 7–18)
CALCIUM SERPL-MCNC: 9 MG/DL — SIGNIFICANT CHANGE UP (ref 8.4–10.5)
CHLORIDE SERPL-SCNC: 107 MMOL/L — SIGNIFICANT CHANGE UP (ref 96–108)
CO2 SERPL-SCNC: 29 MMOL/L — SIGNIFICANT CHANGE UP (ref 22–31)
CREAT SERPL-MCNC: 0.66 MG/DL — SIGNIFICANT CHANGE UP (ref 0.5–1.3)
GLUCOSE BLDC GLUCOMTR-MCNC: 103 MG/DL — HIGH (ref 70–99)
GLUCOSE BLDC GLUCOMTR-MCNC: 92 MG/DL — SIGNIFICANT CHANGE UP (ref 70–99)
GLUCOSE BLDC GLUCOMTR-MCNC: 95 MG/DL — SIGNIFICANT CHANGE UP (ref 70–99)
GLUCOSE SERPL-MCNC: 92 MG/DL — SIGNIFICANT CHANGE UP (ref 70–99)
HCT VFR BLD CALC: 42 % — SIGNIFICANT CHANGE UP (ref 34.5–45)
HGB BLD-MCNC: 13.2 G/DL — SIGNIFICANT CHANGE UP (ref 11.5–15.5)
MCHC RBC-ENTMCNC: 27.4 PG — SIGNIFICANT CHANGE UP (ref 27–34)
MCHC RBC-ENTMCNC: 31.4 GM/DL — LOW (ref 32–36)
MCV RBC AUTO: 87.1 FL — SIGNIFICANT CHANGE UP (ref 80–100)
NRBC # BLD: 0 /100 WBCS — SIGNIFICANT CHANGE UP (ref 0–0)
PLATELET # BLD AUTO: 236 K/UL — SIGNIFICANT CHANGE UP (ref 150–400)
POTASSIUM SERPL-MCNC: 3.9 MMOL/L — SIGNIFICANT CHANGE UP (ref 3.5–5.3)
POTASSIUM SERPL-SCNC: 3.9 MMOL/L — SIGNIFICANT CHANGE UP (ref 3.5–5.3)
PROT SERPL-MCNC: 6.9 G/DL — SIGNIFICANT CHANGE UP (ref 6–8.3)
RBC # BLD: 4.82 M/UL — SIGNIFICANT CHANGE UP (ref 3.8–5.2)
RBC # FLD: 13.5 % — SIGNIFICANT CHANGE UP (ref 10.3–14.5)
SODIUM SERPL-SCNC: 140 MMOL/L — SIGNIFICANT CHANGE UP (ref 135–145)
WBC # BLD: 9.69 K/UL — SIGNIFICANT CHANGE UP (ref 3.8–10.5)
WBC # FLD AUTO: 9.69 K/UL — SIGNIFICANT CHANGE UP (ref 3.8–10.5)

## 2019-10-07 RX ORDER — FLUOXETINE HCL 10 MG
1 CAPSULE ORAL
Qty: 15 | Refills: 0
Start: 2019-10-07 | End: 2019-10-21

## 2019-10-07 NOTE — PROGRESS NOTE ADULT - SUBJECTIVE AND OBJECTIVE BOX
PGY 1 Note discussed with supervising resident and primary attending    Patient is a 44y old  Female who presents with a chief complaint of Seizure (06 Oct 2019 19:19)      INTERVAL HPI/OVERNIGHT EVENTS: offers no new complaints; current symptoms resolving    MEDICATIONS  (STANDING):  LORazepam   Injectable 2 milliGRAM(s) IV Push every 10 minutes  sodium chloride 0.9%. 1000 milliLiter(s) (70 mL/Hr) IV Continuous <Continuous>    MEDICATIONS  (PRN):  acetaminophen   Tablet .. 650 milliGRAM(s) Oral every 6 hours PRN Severe Pain (7 - 10)  cyclobenzaprine 5 milliGRAM(s) Oral three times a day PRN Muscle Spasm  gabapentin 300 milliGRAM(s) Oral every 8 hours PRN nerve pain      __________________________________________________  REVIEW OF SYSTEMS:    CONSTITUTIONAL: No fever,   EYES: no acute visual disturbances  NECK: No pain or stiffness  RESPIRATORY: No cough; No shortness of breath  CARDIOVASCULAR: No chest pain, no palpitations  GASTROINTESTINAL: No pain. No nausea or vomiting; No diarrhea   NEUROLOGICAL: No headache or numbness, no tremors  MUSCULOSKELETAL: No joint pain, no muscle pain  GENITOURINARY: no dysuria, no frequency, no hesitancy  PSYCHIATRY: no depression , no anxiety  ALL OTHER  ROS negative        Vital Signs Last 24 Hrs  T(C): 36.4 (07 Oct 2019 05:27), Max: 36.8 (06 Oct 2019 13:13)  T(F): 97.6 (07 Oct 2019 05:27), Max: 98.3 (06 Oct 2019 13:13)  HR: 95 (07 Oct 2019 09:32) (65 - 95)  BP: 124/88 (07 Oct 2019 09:32) (94/57 - 124/88)  BP(mean): --  RR: 18 (07 Oct 2019 05:27) (16 - 19)  SpO2: 100% (07 Oct 2019 05:27) (99% - 100%)    ________________________________________________  PHYSICAL EXAM:  GENERAL: NAD  HEENT: Normocephalic;  conjunctivae and sclerae clear; moist mucous membranes;   NECK : supple  CHEST/LUNG: Clear to auscultation bilaterally with good air entry   HEART: S1 S2  regular; no murmurs, gallops or rubs  ABDOMEN: Soft, Nontender, Nondistended; Bowel sounds present  EXTREMITIES: no cyanosis; no edema; no calf tenderness  SKIN: warm and dry; no rash  NERVOUS SYSTEM:  Awake and alert; Oriented  to place, person and time ; no new deficits    _________________________________________________  LABS:                        13.2   9.69  )-----------( 236      ( 07 Oct 2019 06:20 )             42.0     10-07    140  |  107  |  17  ----------------------------<  92  3.9   |  29  |  0.66    Ca    9.0      07 Oct 2019 06:20    TPro  6.9  /  Alb  3.1<L>  /  TBili  0.8  /  DBili  x   /  AST  21  /  ALT  36  /  AlkPhos  93  10-07        CAPILLARY BLOOD GLUCOSE      POCT Blood Glucose.: 92 mg/dL (07 Oct 2019 05:43)  POCT Blood Glucose.: 98 mg/dL (06 Oct 2019 23:39)  POCT Blood Glucose.: 88 mg/dL (06 Oct 2019 12:12)        RADIOLOGY & ADDITIONAL TESTS:    Imaging Personally Reviewed:  YES/NO    Consultant(s) Notes Reviewed:   YES/ No    Care Discussed with Consultants :     Plan of care was discussed with patient and /or primary care giver; all questions and concerns were addressed and care was aligned with patient's wishes.

## 2019-10-07 NOTE — DISCHARGE NOTE PROVIDER - CARE PROVIDER_API CALL
Portillo Melendez (DO)  Psychiatry  [address] [place] [address]  Phone: [phone]  Fax: [phone]  Follow Up Time:

## 2019-10-07 NOTE — DISCHARGE NOTE PROVIDER - NSDCCPCAREPLAN_GEN_ALL_CORE_FT
PRINCIPAL DISCHARGE DIAGNOSIS  Diagnosis: Seizure  Assessment and Plan of Treatment: You presented after episode of seizure at home, ypu also had some episodes of shaking body movement while in hospital, EEG was done it was negative for any seizure activity.  keppra was held. 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 You are recommended to follow up with neurologist as out patient upon discharge.      SECONDARY DISCHARGE DIAGNOSES  Diagnosis: Adjustment disorder with anxiety  Assessment and Plan of Treatment: PRINCIPAL DISCHARGE DIAGNOSIS  Diagnosis: Seizure  Assessment and Plan of Treatment: You presented after episode of seizure at home, ypu also had some episodes of shaking body movement while in hospital, EEG was done it was negative for any seizure activity.  keppra was held. 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 You are recommended to follow up with neurologist as out patient and epileptic specialist at [phone].      SECONDARY DISCHARGE DIAGNOSES  Diagnosis: Adjustment disorder with anxiety  Assessment and Plan of Treatment: Psych was consulted, they suggested Adjustment disorder with anxiety and recommended to you to follow up as outpatient  at [facility] upon discharge [phone] for outpatient      Diagnosis: Charcot Zoey Tooth muscular atrophy  Assessment and Plan of Treatment: You were already diagnosed with Charcot marrie tooth disease,you are recommended to follow up with neurologist as outpatient for further recommendations. PRINCIPAL DISCHARGE DIAGNOSIS  Diagnosis: Seizure  Assessment and Plan of Treatment: You presented after episode of seizure at home, ypu also had some episodes of shaking body movement while in hospital, EEG was done it was negative for any seizure activity.  keppra was held. 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 We are sending you with fluoxetine 10 mg for 15 days and you can follow up with psych as outpatient for further recommendations.You are recommended to follow up with neurologist as out patient and epileptic specialist at [phone].      SECONDARY DISCHARGE DIAGNOSES  Diagnosis: Adjustment disorder with anxiety  Assessment and Plan of Treatment: Psych was consulted, they suggested Adjustment disorder with anxiety and recommended to you to follow up as outpatient  at [facility] upon discharge [phone] for outpatient      Diagnosis: Charcot Zoey Tooth muscular atrophy  Assessment and Plan of Treatment: You were already diagnosed with Charcot marrie tooth disease,you are recommended to follow up with neurologist as outpatient for further recommendations.

## 2019-10-07 NOTE — DISCHARGE NOTE PROVIDER - NSDCFUSCHEDAPPT_GEN_ALL_CORE_FT
LAURIE CHO ; 11/14/2019 ; NPP Rheum [address] [facility]  LAURIE CHO ; 12/05/2019 ; NP Neuro [address] [facility] LAURIE CHO ; 11/14/2019 ; NPP Rheum [address] [facility]  LAURIE CHO ; 12/05/2019 ; NP Neuro [address] [facility] LAURIE CHO ; 11/14/2019 ; NPP Rheum [address] [facility]  LAURIE CHO ; 12/05/2019 ; NP Neuro [address] [facility]

## 2019-10-07 NOTE — PROGRESS NOTE ADULT - PROBLEM SELECTOR PLAN 3
Indirect bilirubin high on admission  pt reports she has gilbert syndrome  abd USG: fatty changes in liver   Repeat bilirubin normal

## 2019-10-07 NOTE — PROGRESS NOTE ADULT - PROBLEM SELECTOR PLAN 4
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

## 2019-10-07 NOTE — PROGRESS NOTE ADULT - PROBLEM SELECTOR PLAN 1
Recurrent seizure episodes with unclear etiology,  multiple episodes in the hospital  Recent MRI and EEG non-conclusive, MS workup neg  EEG normal  D/c keppra.

## 2019-10-07 NOTE — PHYSICAL THERAPY INITIAL EVALUATION ADULT - GENERAL OBSERVATIONS, REHAB EVAL
pt aox4 pleasant, seizures precautions, I bedmobility and transfers, standby assistance provided with personal cane amb and stairs negotiations assessment

## 2019-10-24 NOTE — BEHAVIORAL HEALTH ASSESSMENT NOTE - DIFFERENTIAL
Fluence Units: J/cm2 Pulse Delay (In Milliseconds): 0 Consent: Written consent obtained, risks reviewed including but not limited to crusting, scabbing, blistering, scarring, darker or lighter pigmentary change, bruising, and/or incomplete response. Skin Type (Optional): I Number Of Passes: 3 Detail Level: Zone Procedure Text: IPL spot tx. on sides of nose. I Facial Tele, single pulse, shallow vascular, 30j/cm on nose. Rt. in 4 weeks  for a full IPL \\n Treated Area: medium area Total Pulses: 1 Post-Care Instructions: I reviewed with the patient in detail post-care instructions. Patient should stay away from the sun and wear sun protection until treated areas are fully healed. Fluence: 30 R/O Conversion disorder, R/O Somatization disorder

## 2019-10-31 PROCEDURE — 80076 HEPATIC FUNCTION PANEL: CPT

## 2019-10-31 PROCEDURE — 97161 PT EVAL LOW COMPLEX 20 MIN: CPT

## 2019-10-31 PROCEDURE — 84146 ASSAY OF PROLACTIN: CPT

## 2019-10-31 PROCEDURE — 82746 ASSAY OF FOLIC ACID SERUM: CPT

## 2019-10-31 PROCEDURE — 84702 CHORIONIC GONADOTROPIN TEST: CPT

## 2019-10-31 PROCEDURE — 84443 ASSAY THYROID STIM HORMONE: CPT

## 2019-10-31 PROCEDURE — 84100 ASSAY OF PHOSPHORUS: CPT

## 2019-10-31 PROCEDURE — 82962 GLUCOSE BLOOD TEST: CPT

## 2019-10-31 PROCEDURE — 80307 DRUG TEST PRSMV CHEM ANLYZR: CPT

## 2019-10-31 PROCEDURE — 70450 CT HEAD/BRAIN W/O DYE: CPT

## 2019-10-31 PROCEDURE — 80053 COMPREHEN METABOLIC PANEL: CPT

## 2019-10-31 PROCEDURE — 80061 LIPID PANEL: CPT

## 2019-10-31 PROCEDURE — 83605 ASSAY OF LACTIC ACID: CPT

## 2019-10-31 PROCEDURE — 87086 URINE CULTURE/COLONY COUNT: CPT

## 2019-10-31 PROCEDURE — 84681 ASSAY OF C-PEPTIDE: CPT

## 2019-10-31 PROCEDURE — 83036 HEMOGLOBIN GLYCOSYLATED A1C: CPT

## 2019-10-31 PROCEDURE — 95957 EEG DIGITAL ANALYSIS: CPT

## 2019-10-31 PROCEDURE — 80048 BASIC METABOLIC PNL TOTAL CA: CPT

## 2019-10-31 PROCEDURE — 83615 LACTATE (LD) (LDH) ENZYME: CPT

## 2019-10-31 PROCEDURE — 82306 VITAMIN D 25 HYDROXY: CPT

## 2019-10-31 PROCEDURE — 84145 PROCALCITONIN (PCT): CPT

## 2019-10-31 PROCEDURE — 82607 VITAMIN B-12: CPT

## 2019-10-31 PROCEDURE — 99285 EMERGENCY DEPT VISIT HI MDM: CPT | Mod: 25

## 2019-10-31 PROCEDURE — 81003 URINALYSIS AUTO W/O SCOPE: CPT

## 2019-10-31 PROCEDURE — 76700 US EXAM ABDOM COMPLETE: CPT

## 2019-10-31 PROCEDURE — 82550 ASSAY OF CK (CPK): CPT

## 2019-10-31 PROCEDURE — 95819 EEG AWAKE AND ASLEEP: CPT

## 2019-10-31 PROCEDURE — 85027 COMPLETE CBC AUTOMATED: CPT

## 2019-10-31 PROCEDURE — 93005 ELECTROCARDIOGRAM TRACING: CPT

## 2019-10-31 PROCEDURE — 36415 COLL VENOUS BLD VENIPUNCTURE: CPT

## 2019-11-01 ENCOUNTER — OUTPATIENT (OUTPATIENT)
Dept: OUTPATIENT SERVICES | Facility: HOSPITAL | Age: 44
LOS: 1 days | End: 2019-11-01
Payer: MEDICAID

## 2019-11-01 PROCEDURE — G9001: CPT

## 2019-11-11 DIAGNOSIS — Z71.89 OTHER SPECIFIED COUNSELING: ICD-10-CM

## 2019-11-15 NOTE — DISCHARGE NOTE PROVIDER - HOSPITAL COURSE
Pt is a 44 yr old female with PMH of asthma, CMF disease, extensive seizure h/o not on any anti-seizure medications was brought in for witnessed episode of seizure while she was in her PT session. Pt states that she has been having seizures with initial episodes in 2008, underwent workup and was not started on any anti-seizure medications coming in for generalized tonic clonic seizure. As per patient she had 5-6 seizures daily for 1 year in 2008 that improved but she continues to have seizures. Pt follows neurologist and rheumatologist as out-patient. Today in PT session she states that she felt dizzy and some vision changes and then she doesn't remember. As per daughter she had a tonic clonic seizure, pt was biting lips, no bowel/bladder incontinence noted. Pt also complaint of dull headache. Pt denies any chest pain, syncope, neck pain, fevers at home, ill contact, new medication, N/V/D. Pt had a MRI and EEG done in August 2019 that was negative. Pt denies smoking, alcohol or drug usage. 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             She had some episodes of shaking while inpatient, neurologist was consulted.        her EEg was done it was negative for any seizure activity , keppra was hel and diagnosis of pseudoseizure was made  she can follow up with neurologist as outpatient .        Psych was also consulted to see her , they suggested adjustment disorder with anxiety.[facility] upon discharge [phone] for outpatient [Alert] : alert [Awake] : awake [Thyroid Nodule] : no thyroid nodule [Acute Distress] : no acute distress [LAD] : no lymphadenopathy [Goiter] : no goiter [Mass] : no breast mass [Nipple Discharge] : no nipple discharge [Axillary LAD] : no axillary lymphadenopathy [Tender] : non tender [Soft] : soft [H/Smegaly] : no hepatosplenomegaly [Distended] : not distended [Oriented x3] : oriented to person, place, and time [Flat Affect] : affect not flat [Depressed Mood] : not depressed [Normal] : uterus [Anteversion] : anteverted [No Bleeding] : there was no active vaginal bleeding [Enlarged ___ wks] : not enlarged [Tenderness] : nontender [Uterine Adnexae] : were not tender and not enlarged [RRR, No Murmurs] : RRR, no murmurs [CTAB] : CTAB

## 2019-11-27 ENCOUNTER — APPOINTMENT (OUTPATIENT)
Dept: RHEUMATOLOGY | Facility: CLINIC | Age: 44
End: 2019-11-27

## 2019-12-03 ENCOUNTER — TRANSCRIPTION ENCOUNTER (OUTPATIENT)
Age: 44
End: 2019-12-03

## 2019-12-05 ENCOUNTER — APPOINTMENT (OUTPATIENT)
Dept: NEUROLOGY | Facility: CLINIC | Age: 44
End: 2019-12-05

## 2020-01-30 ENCOUNTER — APPOINTMENT (OUTPATIENT)
Dept: RHEUMATOLOGY | Facility: CLINIC | Age: 45
End: 2020-01-30
Payer: MEDICAID

## 2020-01-30 VITALS
BODY MASS INDEX: 29.83 KG/M2 | DIASTOLIC BLOOD PRESSURE: 86 MMHG | HEIGHT: 61 IN | WEIGHT: 158 LBS | SYSTOLIC BLOOD PRESSURE: 128 MMHG | RESPIRATION RATE: 16 BRPM | HEART RATE: 75 BPM | OXYGEN SATURATION: 99 % | TEMPERATURE: 98.4 F

## 2020-01-30 DIAGNOSIS — M77.12 LATERAL EPICONDYLITIS, LEFT ELBOW: ICD-10-CM

## 2020-01-30 DIAGNOSIS — G56.01 CARPAL TUNNEL SYNDROME, RIGHT UPPER LIMB: ICD-10-CM

## 2020-01-30 DIAGNOSIS — M76.60 ACHILLES TENDINITIS, UNSPECIFIED LEG: ICD-10-CM

## 2020-01-30 DIAGNOSIS — M65.4 RADIAL STYLOID TENOSYNOVITIS [DE QUERVAIN]: ICD-10-CM

## 2020-01-30 DIAGNOSIS — G56.02 CARPAL TUNNEL SYNDROME, LEFT UPPER LIMB: ICD-10-CM

## 2020-01-30 PROCEDURE — 20605 DRAIN/INJ JOINT/BURSA W/O US: CPT | Mod: LT

## 2020-01-30 PROCEDURE — 99213 OFFICE O/P EST LOW 20 MIN: CPT | Mod: 25

## 2020-01-30 RX ORDER — LIDOCAINE HYDROCHLORIDE 10 MG/ML
1 INJECTION, SOLUTION INFILTRATION; PERINEURAL
Qty: 0 | Refills: 0 | Status: COMPLETED | OUTPATIENT
Start: 2020-01-30

## 2020-01-30 RX ORDER — METHYLPRED ACET/NACL,ISO-OS/PF 40 MG/ML
40 VIAL (ML) INJECTION
Qty: 1 | Refills: 0 | Status: COMPLETED | OUTPATIENT
Start: 2020-01-30

## 2020-01-30 RX ADMIN — LIDOCAINE HYDROCHLORIDE 0 %: 10 INJECTION, SOLUTION INFILTRATION; PERINEURAL at 00:00

## 2020-01-30 RX ADMIN — METHYLPREDNISOLONE ACETATE 0 MG/ML: 40 INJECTION, SUSPENSION INTRA-ARTICULAR; INTRALESIONAL; INTRAMUSCULAR; SOFT TISSUE at 00:00

## 2020-02-01 PROBLEM — M65.4 TENOSYNOVITIS, DE QUERVAIN: Status: ACTIVE | Noted: 2019-01-06

## 2020-02-01 PROBLEM — G56.01 CARPAL TUNNEL SYNDROME, RIGHT: Status: ACTIVE | Noted: 2018-05-31

## 2020-02-01 PROBLEM — G56.02 CARPAL TUNNEL SYNDROME OF LEFT WRIST: Status: ACTIVE | Noted: 2018-05-03

## 2020-02-01 NOTE — PHYSICAL EXAM
[General Appearance - Alert] : alert [Sclera] : the sclera and conjunctiva were normal [General Appearance - In No Acute Distress] : in no acute distress [Neck Appearance] : the appearance of the neck was normal [Examination Of The Oral Cavity] : the lips and gums were normal [Oropharynx] : the oropharynx was normal [Auscultation Breath Sounds / Voice Sounds] : lungs were clear to auscultation bilaterally [Heart Rate And Rhythm] : heart rate was normal and rhythm regular [Edema] : there was no peripheral edema [No Spinal Tenderness] : no spinal tenderness [Abnormal Walk] : normal gait [Nail Clubbing] : no clubbing  or cyanosis of the fingernails [Motor Tone] : muscle strength and tone were normal [Musculoskeletal - Swelling] : no joint swelling seen [] : no rash [Skin Lesions] : no skin lesions [Oriented To Time, Place, And Person] : oriented to person, place, and time [FreeTextEntry1] : no active synovitis; Finkelstein +, tender over the epicondyles L>R ; restricted lateral neck flexion; Tender over. strap muscles, appropriate shoulder abduction; tender over the medial joint lines of the knee;

## 2020-02-01 NOTE — PROCEDURE
[Other Date:___] : Date: [unfilled] [Patient] : the patient [Risks] : risks [Consent Obtained] : written consent was obtained prior to the procedure and is detailed in the patient's record [Benefits] : benefits [Therapeutic] : therapeutic [Betadine] : betadine solution [#1 Site: ______] : #1 site identified in the [unfilled] [___ml 1% Lidocaine] : [unfilled] ml of 1% lidocaine [25 gauge 1.5 inch] : A 25 gauge 1.5 inch needle was used [Depomedrol ___ mg] : Depomedrol [unfilled] mg [No Complications] : there were no complications [Patient Instructed to Call] : patient was instructed to call if redness at site, a decrease in range of motion or an increase in pain is noted after procedure. [Tolerated Well] : the patient tolerated the procedure well

## 2020-02-01 NOTE — CONSULT LETTER
[Dear  ___] : Dear  [unfilled], [Courtesy Letter:] : I had the pleasure of seeing your patient, [unfilled], in my office today. [Please see my note below.] : Please see my note below. [Consult Closing:] : Thank you very much for allowing me to participate in the care of this patient.  If you have any questions, please do not hesitate to contact me. [Sincerely,] : Sincerely, [FreeTextEntry3] : Fany Vann M.D.\par  of Medicine \par [facility] [employer] [facility]\par \par  [FreeTextEntry2] : Christy Floyd MD\par [address]\par [facility] [address] \par

## 2020-02-01 NOTE — HISTORY OF PRESENT ILLNESS
[FreeTextEntry1] : She reports progressive , resurfacing pain and paresthesias upon lifting items more than five pounds. She finds it difficult to comb her hair at times as well as reporting dropping objects from hand secondary to weakness. She continues to wear a wrist splint intermittently; she also expresses fatigue.  \par She reiterates recent genetic testing reflecting + gene testing for Charcot Zoey disease.  Patient underwent full Neurology evaluation including MR, EEG, results below.   She explains hx of seizures bought on by loud flashing sirens,  visual disturbances; she notes LE weakness.\par She otherwise denies systemic symptoms.

## 2020-02-01 NOTE — ASSESSMENT
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  Patient encouraged to wear wrist splints for joint stabilization.  Elbow sleeve script given for lateral epicondylitis along with cortisone injection.  Cervical neck exercises demonstrated.  \par Quadriceps strengthening exercises reiterated in the office. Weight loss has been encouraged to reduce load over the medial joint line. Viscosupplementation has been encouraged to provide additional lubrication and joint support. In the interim, Diclofenac gel has been prescribed as well as judicious use of anti-inflammatory therapy if with worsening pain. \par She is in agreement with the above plan and will return in one months' time.\par \par  \par

## 2020-04-28 ENCOUNTER — APPOINTMENT (OUTPATIENT)
Dept: RHEUMATOLOGY | Facility: CLINIC | Age: 45
End: 2020-04-28
Payer: MEDICAID

## 2020-04-28 PROCEDURE — 99443: CPT

## 2020-04-30 ENCOUNTER — APPOINTMENT (OUTPATIENT)
Dept: RHEUMATOLOGY | Facility: CLINIC | Age: 45
End: 2020-04-30

## 2021-12-07 ENCOUNTER — APPOINTMENT (OUTPATIENT)
Dept: RHEUMATOLOGY | Facility: CLINIC | Age: 46
End: 2021-12-07

## 2023-05-31 NOTE — BEHAVIORAL HEALTH ASSESSMENT NOTE - CONSEQUENCES
N/A Consent (Near Eyelid Margin)/Introductory Paragraph: The rationale for Mohs was explained to the patient and consent was obtained. The risks, benefits and alternatives to therapy were discussed in detail. Specifically, the risks of ectropion or eyelid deformity, infection, scarring, bleeding, prolonged wound healing, incomplete removal, allergy to anesthesia, nerve injury and recurrence were addressed. Prior to the procedure, the treatment site was clearly identified and confirmed by the patient. All components of Universal Protocol/PAUSE Rule completed.

## 2023-09-28 NOTE — PATIENT PROFILE ADULT - NSPRESCRALCAMT_GEN_A_NUR
From: Martha Snell  To: Riana Hughes  Sent: 2023 3:45 PM CDT  Subject: Insulin prescripton    When reordering all of my regular prescriptions today, they would not refill my Lantus SolorStar pens saying that it had  at the last refill even though the box of have been using from says next refill is 23. I talked to an agent from [employer] [facility] and they told me I needed to get a new prescription anyway so could you please send that to [facility] [phone] or FAX [phone]. Thank you for your assistance.   Martha Snell, 1945  [phone]  
Please see message below and advise.   
1 or 2

## 2024-06-27 NOTE — CONSULT NOTE ADULT - REASON FOR ADMISSION
have any active services?: No  Do you have any needs or concerns that I can assist you with?: No  Care Transitions Interventions     Transportation Support: Declined   Other Interventions:              Follow Up Appointment:   Reviewed upcoming appointment(s).  Future Appointments         Provider Specialty Dept Phone    7/11/2024 1:00 PM Luci LakiaEYAD - CNP Cardiology [phone]    8/12/2024 1:40 PM Pastor Howard MD Nephrology [phone]    10/18/2024 10:00 AM Sapphire Swift MD Cardiology [phone]    12/5/2024 2:00 PM SCHEDULE, [facility] PACER NURSE Cardiology [phone]    12/9/2024 1:20 PM Pastor Howard MD Nephrology [phone]    5/27/2025 2:45 PM Sapphire Swift MD Cardiology [phone]            Care Transition Nurse provided contact information.  Plan for follow-up call in 6-10 days based on severity of symptoms and risk factors.  Plan for next call: symptom management-weight, swelling in right leg, SOB, FBS, CP, any new or worsening symptoms, review PCP appt, any new or or changed meds?      Melinda Handley RN       Seizure

## 2025-03-21 NOTE — DISCHARGE NOTE NURSING/CASE MANAGEMENT/SOCIAL WORK - NURSING SECTION COMPLETE
Rapid strep: negative  + Influenza B, mother notified  Rx for Tamiflu, take as prescribed to decrease duration of illness.   Rest  Fluids  Delysm cough suppressant  All questions addressed  Follow up with PCP if without improvement  Discussed return precautions.    Patient/Caregiver provided printed discharge information.